# Patient Record
Sex: FEMALE | Race: WHITE | NOT HISPANIC OR LATINO | Employment: FULL TIME | URBAN - METROPOLITAN AREA
[De-identification: names, ages, dates, MRNs, and addresses within clinical notes are randomized per-mention and may not be internally consistent; named-entity substitution may affect disease eponyms.]

---

## 2023-04-05 ENCOUNTER — TELEPHONE (OUTPATIENT)
Dept: PRIMARY CARE | Facility: CLINIC | Age: 23
End: 2023-04-05
Payer: COMMERCIAL

## 2023-04-05 NOTE — TELEPHONE ENCOUNTER
PATIENT CALLED AND SAID SHE TESTED POSITIVE FOR COVID AND WANTED TO KNOW IF SHE COULD BE PRESCRIBE THE MEDICATION FOR IT.SHE WASN'T SURE IF SHE NEEDED AN APPOINTMENT OR NOT

## 2023-05-31 ENCOUNTER — OFFICE VISIT (OUTPATIENT)
Dept: PRIMARY CARE | Facility: CLINIC | Age: 23
End: 2023-05-31
Payer: COMMERCIAL

## 2023-05-31 VITALS
BODY MASS INDEX: 34.11 KG/M2 | OXYGEN SATURATION: 100 % | WEIGHT: 186.5 LBS | RESPIRATION RATE: 18 BRPM | HEART RATE: 67 BPM | DIASTOLIC BLOOD PRESSURE: 78 MMHG | TEMPERATURE: 97.5 F | SYSTOLIC BLOOD PRESSURE: 122 MMHG

## 2023-05-31 DIAGNOSIS — M54.2 NECK PAIN: Primary | ICD-10-CM

## 2023-05-31 DIAGNOSIS — Z00.00 ANNUAL PHYSICAL EXAM: ICD-10-CM

## 2023-05-31 PROBLEM — N92.0 MENSTRUAL SPOTTING: Status: ACTIVE | Noted: 2023-05-31

## 2023-05-31 PROBLEM — G43.909 MIGRAINE: Status: ACTIVE | Noted: 2023-05-31

## 2023-05-31 PROBLEM — R30.0 DYSURIA: Status: ACTIVE | Noted: 2023-05-31

## 2023-05-31 PROBLEM — R11.0 NAUSEA IN ADULT PATIENT: Status: ACTIVE | Noted: 2023-05-31

## 2023-05-31 PROBLEM — R10.2 PELVIC PAIN IN FEMALE: Status: ACTIVE | Noted: 2023-05-31

## 2023-05-31 PROBLEM — F41.9 ANXIETY: Status: ACTIVE | Noted: 2023-05-31

## 2023-05-31 PROCEDURE — 1036F TOBACCO NON-USER: CPT | Performed by: FAMILY MEDICINE

## 2023-05-31 PROCEDURE — 99214 OFFICE O/P EST MOD 30 MIN: CPT | Performed by: FAMILY MEDICINE

## 2023-05-31 RX ORDER — NADOLOL 40 MG/1
2 TABLET ORAL DAILY
COMMUNITY
Start: 2021-07-15 | End: 2024-05-30 | Stop reason: ALTCHOICE

## 2023-05-31 RX ORDER — METHYLPHENIDATE HYDROCHLORIDE 36 MG/1
36 TABLET ORAL
COMMUNITY
Start: 2023-05-16

## 2023-05-31 RX ORDER — LEVONORGESTREL 19.5 MG/1
INTRAUTERINE DEVICE INTRAUTERINE ONCE
COMMUNITY
Start: 2022-11-18

## 2023-05-31 RX ORDER — ONDANSETRON 4 MG/1
8 TABLET, FILM COATED ORAL EVERY 8 HOURS PRN
COMMUNITY
Start: 2022-10-26 | End: 2023-05-31 | Stop reason: ALTCHOICE

## 2023-05-31 ASSESSMENT — PAIN SCALES - GENERAL: PAINLEVEL: 6

## 2023-05-31 NOTE — PROGRESS NOTES
Subjective   Patient ID: Nathalia Monzon is a 23 y.o. female who presents for Neck/shoulder pain (L side. Has been present for years but within the past week more persistent. Describes feeling hot, burning, pins/needles. Been dealing with since high school. Not using pain medication. ROM normal, but hurts with movement. ).    HPI : neck and shoulder pain   NKI  No previous work up  Denies weakness/constitutional symptoms    Review of Systems   All other systems reviewed and are negative.      Objective   /78 (BP Location: Left arm, Patient Position: Sitting, BP Cuff Size: Adult)   Pulse 67   Temp 36.4 °C (97.5 °F) (Temporal)   Resp 18   Wt 84.6 kg (186 lb 8 oz)   SpO2 100%   BMI 34.11 kg/m²     Physical Exam  Vitals and nursing note reviewed.   Cardiovascular:      Rate and Rhythm: Normal rate and regular rhythm.   Pulmonary:      Effort: Pulmonary effort is normal.      Breath sounds: Normal breath sounds.   Musculoskeletal:      Cervical back: Neck supple. No tenderness. Spinous process tenderness and muscular tenderness present.   Lymphadenopathy:      Cervical: No cervical adenopathy.   Neurological:      Mental Status: She is alert.      Sensory: Sensation is intact.      Motor: Motor function is intact.         Assessment/Plan   Problem List Items Addressed This Visit    None  Visit Diagnoses       Neck pain    -  Primary    Relevant Orders    XR cervical spine 2-3 views    CBC and Auto Differential    Comprehensive Metabolic Panel    TSH with reflex to Free T4 if abnormal    Lipid panel    High sensitivity CRP    Annual physical exam        Relevant Orders    HIV 1/2 antibodies, rapid    Hepatitis C antibody

## 2023-06-03 ENCOUNTER — LAB (OUTPATIENT)
Dept: LAB | Facility: LAB | Age: 23
End: 2023-06-03
Payer: COMMERCIAL

## 2023-06-03 DIAGNOSIS — R79.82 ELEVATED C-REACTIVE PROTEIN (CRP): Primary | ICD-10-CM

## 2023-06-03 DIAGNOSIS — Z00.00 ANNUAL PHYSICAL EXAM: ICD-10-CM

## 2023-06-03 DIAGNOSIS — M54.2 NECK PAIN: ICD-10-CM

## 2023-06-03 LAB
ALANINE AMINOTRANSFERASE (SGPT) (U/L) IN SER/PLAS: 15 U/L (ref 7–45)
ALBUMIN (G/DL) IN SER/PLAS: 4.3 G/DL (ref 3.4–5)
ALKALINE PHOSPHATASE (U/L) IN SER/PLAS: 93 U/L (ref 33–110)
ANION GAP IN SER/PLAS: 10 MMOL/L (ref 10–20)
ASPARTATE AMINOTRANSFERASE (SGOT) (U/L) IN SER/PLAS: 12 U/L (ref 9–39)
BASOPHILS (10*3/UL) IN BLOOD BY AUTOMATED COUNT: 0.04 X10E9/L (ref 0–0.1)
BASOPHILS/100 LEUKOCYTES IN BLOOD BY AUTOMATED COUNT: 0.5 % (ref 0–2)
BILIRUBIN TOTAL (MG/DL) IN SER/PLAS: 0.4 MG/DL (ref 0–1.2)
C REACTIVE PROTEIN (MG/L) IN SER/PLAS BY HIGH SENSIT: 19.1 MG/L
CALCIUM (MG/DL) IN SER/PLAS: 8.6 MG/DL (ref 8.6–10.3)
CARBON DIOXIDE, TOTAL (MMOL/L) IN SER/PLAS: 28 MMOL/L (ref 21–32)
CHLORIDE (MMOL/L) IN SER/PLAS: 107 MMOL/L (ref 98–107)
CHOLESTEROL (MG/DL) IN SER/PLAS: 166 MG/DL (ref 0–199)
CHOLESTEROL IN HDL (MG/DL) IN SER/PLAS: 34.3 MG/DL
CHOLESTEROL/HDL RATIO: 4.8
CREATININE (MG/DL) IN SER/PLAS: 0.73 MG/DL (ref 0.5–1.05)
EOSINOPHILS (10*3/UL) IN BLOOD BY AUTOMATED COUNT: 0.09 X10E9/L (ref 0–0.7)
EOSINOPHILS/100 LEUKOCYTES IN BLOOD BY AUTOMATED COUNT: 1.1 % (ref 0–6)
ERYTHROCYTE DISTRIBUTION WIDTH (RATIO) BY AUTOMATED COUNT: 13.4 % (ref 11.5–14.5)
ERYTHROCYTE MEAN CORPUSCULAR HEMOGLOBIN CONCENTRATION (G/DL) BY AUTOMATED: 32.4 G/DL (ref 32–36)
ERYTHROCYTE MEAN CORPUSCULAR VOLUME (FL) BY AUTOMATED COUNT: 90 FL (ref 80–100)
ERYTHROCYTES (10*6/UL) IN BLOOD BY AUTOMATED COUNT: 4.58 X10E12/L (ref 4–5.2)
GFR FEMALE: >90 ML/MIN/1.73M2
GLUCOSE (MG/DL) IN SER/PLAS: 94 MG/DL (ref 74–99)
HEMATOCRIT (%) IN BLOOD BY AUTOMATED COUNT: 41.1 % (ref 36–46)
HEMOGLOBIN (G/DL) IN BLOOD: 13.3 G/DL (ref 12–16)
HEPATITIS C VIRUS AB PRESENCE IN SERUM: NONREACTIVE
HIV 1/ 2 AG/AB SCREEN: NONREACTIVE
IMMATURE GRANULOCYTES/100 LEUKOCYTES IN BLOOD BY AUTOMATED COUNT: 0.3 % (ref 0–0.9)
LDL: 117 MG/DL (ref 0–119)
LEUKOCYTES (10*3/UL) IN BLOOD BY AUTOMATED COUNT: 8 X10E9/L (ref 4.4–11.3)
LYMPHOCYTES (10*3/UL) IN BLOOD BY AUTOMATED COUNT: 2.24 X10E9/L (ref 1.2–4.8)
LYMPHOCYTES/100 LEUKOCYTES IN BLOOD BY AUTOMATED COUNT: 28 % (ref 13–44)
MONOCYTES (10*3/UL) IN BLOOD BY AUTOMATED COUNT: 0.56 X10E9/L (ref 0.1–1)
MONOCYTES/100 LEUKOCYTES IN BLOOD BY AUTOMATED COUNT: 7 % (ref 2–10)
NEUTROPHILS (10*3/UL) IN BLOOD BY AUTOMATED COUNT: 5.04 X10E9/L (ref 1.2–7.7)
NEUTROPHILS/100 LEUKOCYTES IN BLOOD BY AUTOMATED COUNT: 63.1 % (ref 40–80)
PLATELETS (10*3/UL) IN BLOOD AUTOMATED COUNT: 251 X10E9/L (ref 150–450)
POTASSIUM (MMOL/L) IN SER/PLAS: 4.5 MMOL/L (ref 3.5–5.3)
PROTEIN TOTAL: 6.4 G/DL (ref 6.4–8.2)
SODIUM (MMOL/L) IN SER/PLAS: 140 MMOL/L (ref 136–145)
THYROTROPIN (MIU/L) IN SER/PLAS BY DETECTION LIMIT <= 0.05 MIU/L: 2.93 MIU/L (ref 0.44–3.98)
TRIGLYCERIDE (MG/DL) IN SER/PLAS: 76 MG/DL (ref 0–149)
UREA NITROGEN (MG/DL) IN SER/PLAS: 11 MG/DL (ref 6–23)
VLDL: 15 MG/DL (ref 0–40)

## 2023-06-03 PROCEDURE — 36415 COLL VENOUS BLD VENIPUNCTURE: CPT

## 2023-06-03 PROCEDURE — 86141 C-REACTIVE PROTEIN HS: CPT

## 2023-06-03 PROCEDURE — 87389 HIV-1 AG W/HIV-1&-2 AB AG IA: CPT

## 2023-06-03 PROCEDURE — 86803 HEPATITIS C AB TEST: CPT

## 2023-06-03 PROCEDURE — 80061 LIPID PANEL: CPT

## 2023-06-03 PROCEDURE — 84443 ASSAY THYROID STIM HORMONE: CPT

## 2023-06-03 PROCEDURE — 80053 COMPREHEN METABOLIC PANEL: CPT

## 2023-06-03 PROCEDURE — 85025 COMPLETE CBC W/AUTO DIFF WBC: CPT

## 2023-06-15 ENCOUNTER — APPOINTMENT (OUTPATIENT)
Dept: LAB | Facility: LAB | Age: 23
End: 2023-06-15
Payer: COMMERCIAL

## 2023-06-15 LAB
ANTI-DNA (DS): 1 IU/ML
BASOPHILS (10*3/UL) IN BLOOD BY AUTOMATED COUNT: 0.06 X10E9/L (ref 0–0.1)
BASOPHILS/100 LEUKOCYTES IN BLOOD BY AUTOMATED COUNT: 0.5 % (ref 0–2)
C REACTIVE PROTEIN (MG/L) IN SER/PLAS: 2.29 MG/DL
CITRULLINE ANTIBODY, IGG: <1 U/ML
COMPLEMENT C3 (MG/DL) IN SER/PLAS: 167 MG/DL (ref 87–200)
COMPLEMENT C4 (MG/DL) IN SER/PLAS: 33 MG/DL (ref 10–50)
EOSINOPHILS (10*3/UL) IN BLOOD BY AUTOMATED COUNT: 0.05 X10E9/L (ref 0–0.7)
EOSINOPHILS/100 LEUKOCYTES IN BLOOD BY AUTOMATED COUNT: 0.4 % (ref 0–6)
ERYTHROCYTE DISTRIBUTION WIDTH (RATIO) BY AUTOMATED COUNT: 13 % (ref 11.5–14.5)
ERYTHROCYTE MEAN CORPUSCULAR HEMOGLOBIN CONCENTRATION (G/DL) BY AUTOMATED: 32.7 G/DL (ref 32–36)
ERYTHROCYTE MEAN CORPUSCULAR VOLUME (FL) BY AUTOMATED COUNT: 88 FL (ref 80–100)
ERYTHROCYTES (10*6/UL) IN BLOOD BY AUTOMATED COUNT: 5.07 X10E12/L (ref 4–5.2)
HEMATOCRIT (%) IN BLOOD BY AUTOMATED COUNT: 44.7 % (ref 36–46)
HEMOGLOBIN (G/DL) IN BLOOD: 14.6 G/DL (ref 12–16)
IMMATURE GRANULOCYTES/100 LEUKOCYTES IN BLOOD BY AUTOMATED COUNT: 1.2 % (ref 0–0.9)
LEUKOCYTES (10*3/UL) IN BLOOD BY AUTOMATED COUNT: 12.1 X10E9/L (ref 4.4–11.3)
LYMPHOCYTES (10*3/UL) IN BLOOD BY AUTOMATED COUNT: 2.57 X10E9/L (ref 1.2–4.8)
LYMPHOCYTES/100 LEUKOCYTES IN BLOOD BY AUTOMATED COUNT: 21.3 % (ref 13–44)
MONOCYTES (10*3/UL) IN BLOOD BY AUTOMATED COUNT: 0.69 X10E9/L (ref 0.1–1)
MONOCYTES/100 LEUKOCYTES IN BLOOD BY AUTOMATED COUNT: 5.7 % (ref 2–10)
NEUTROPHILS (10*3/UL) IN BLOOD BY AUTOMATED COUNT: 8.55 X10E9/L (ref 1.2–7.7)
NEUTROPHILS/100 LEUKOCYTES IN BLOOD BY AUTOMATED COUNT: 70.9 % (ref 40–80)
NRBC (PER 100 WBCS) BY AUTOMATED COUNT: 0 /100 WBC (ref 0–0)
PLATELETS (10*3/UL) IN BLOOD AUTOMATED COUNT: 309 X10E9/L (ref 150–450)
RHEUMATOID FACTOR (IU/ML) IN SERUM OR PLASMA: <10 IU/ML (ref 0–15)
SEDIMENTATION RATE, ERYTHROCYTE: 18 MM/H (ref 0–20)

## 2023-06-16 LAB — ANTI-NUCLEAR ANTIBODY (ANA): NEGATIVE

## 2023-07-28 ENCOUNTER — TELEMEDICINE (OUTPATIENT)
Dept: PRIMARY CARE | Facility: CLINIC | Age: 23
End: 2023-07-28
Payer: COMMERCIAL

## 2023-07-28 DIAGNOSIS — R11.2 NAUSEA VOMITING AND DIARRHEA: Primary | ICD-10-CM

## 2023-07-28 DIAGNOSIS — R19.7 NAUSEA VOMITING AND DIARRHEA: Primary | ICD-10-CM

## 2023-07-28 PROCEDURE — 99213 OFFICE O/P EST LOW 20 MIN: CPT | Performed by: PHYSICIAN ASSISTANT

## 2023-07-28 RX ORDER — ONDANSETRON 4 MG/1
4 TABLET, ORALLY DISINTEGRATING ORAL EVERY 8 HOURS PRN
Qty: 20 TABLET | Refills: 0 | Status: SHIPPED | OUTPATIENT
Start: 2023-07-28 | End: 2023-08-04

## 2023-07-28 NOTE — PROGRESS NOTES
An interactive audio and video telecommunication system which permits real time communications between the patient (at the originating site) and provider (at the distant site) was utilized to provide this telehealth service.   Verbal consent was requested and obtained from Nathalia Monzon on this date, 07/28/23 for a telehealth visit.     Subjective    Nathalia Monzon is a 23 y.o. year old female patient presenting for virtual visit   Chief Complaint   Patient presents with    Diarrhea      Diarrhea 5x daily for 5 days  since just before she left from international trip to [place] and [place]. Some mucus has been noted but no blood. No fevers. She is hydrating well PO. NO cough. No known MERS contacts. She has soft stools poorly formed.   We discussed a more thorough eval at ER if not improving or if worsening.  Continue to hydrate, add electrolytes at this point. Recommend against antidiarrheals. If ongoing symptoms into next week, F/up PCP for Ova/parasites/Cx/toxins  Patient Active Problem List   Diagnosis    Anxiety    Dysuria    Menstrual spotting    Migraine    Nausea in adult patient    Pelvic pain in female       Past Medical History:   Diagnosis Date    Personal history of urinary (tract) infections 07/15/2021    History of urinary tract infection      Past Surgical History:   Procedure Laterality Date    OTHER SURGICAL HISTORY  08/19/2021    No history of surgery      No family history on file.   Social History     Tobacco Use    Smoking status: Never    Smokeless tobacco: Never   Substance Use Topics    Alcohol use: Not on file        Current Outpatient Medications:     levonorgestreL (Kyleena) 17.5 mcg/24 hrs (5 yrs) 19.5 mg intrauterine device, by intrauterine route 1 time., Disp: , Rfl:     methylphenidate (Concerta) 36 mg daily tablet, Take 1 tablet (36 mg) by mouth once daily in the morning. Take before meals., Disp: , Rfl:     nadolol (Corgard) 40 mg tablet, Take 2 tablets (80 mg) by mouth once daily.,  Disp: , Rfl:     ondansetron ODT (Zofran-ODT) 4 mg disintegrating tablet, Take 1 tablet (4 mg) by mouth every 8 hours if needed for nausea or vomiting for up to 7 days., Disp: 20 tablet, Rfl: 0    ubrogepant (Ubrelvy) 100 mg tablet tablet, Take 1 tablet (100 mg) by mouth if needed (Headaches)., Disp: , Rfl:      Review of Systems    Review of Systems:   Constitutional: Denies fever  HEENT: Denies ST, earache  CVS: Denies Chest pain  Pulmonary: Denies wheezing, SOB  GI: Denies N/V  : Denies dysuria  Musculoskeletal:  Denies myalgia  Neuro: Denies focal weakness or numbness.  Skin: Denies Rashes.  *Review of Systems is negative unless otherwise mentioned in HPI or ROS above.     Objective    VITALS  Pt does not have vitals available at time of visit.    Exam    Unable to perform physical exam in virtual platform    Assessment/Plan    Problem List Items Addressed This Visit    None  Visit Diagnoses       Nausea vomiting and diarrhea    -  Primary    Relevant Medications    ondansetron ODT (Zofran-ODT) 4 mg disintegrating tablet                 DISCHARGE    Please schedule a follow up visit     Any prescriptions were sent to the pharmacy, please make sure to pick them up and call if there are any issues or questions.     Thank you for trusting me to be a part of your healthcare.    Take care!     Seema Dozier PA-C  [facility]  [phone] [phone]     Please feel free to call the office, or return a message if you have any questions or concerns.

## 2023-10-26 ENCOUNTER — SPECIALTY PHARMACY (OUTPATIENT)
Dept: PHARMACY | Facility: CLINIC | Age: 23
End: 2023-10-26

## 2023-10-26 ENCOUNTER — PHARMACY VISIT (OUTPATIENT)
Dept: PHARMACY | Facility: CLINIC | Age: 23
End: 2023-10-26
Payer: COMMERCIAL

## 2023-10-26 PROCEDURE — RXMED WILLOW AMBULATORY MEDICATION CHARGE

## 2023-11-06 ENCOUNTER — SPECIALTY PHARMACY (OUTPATIENT)
Dept: PHARMACY | Facility: CLINIC | Age: 23
End: 2023-11-06

## 2023-11-06 NOTE — PROGRESS NOTES
[facility] Clinical Note    Nathalia Monzon is a 23 y.o. female, who is on the specialty pharmacy service for management of: Migraine Core with status of: (Enrolled)     Nathalia was contacted on 11/6/2023.    Refer to the encounter summary report for documentation details about patient counseling and education.      Medication Adherence  The importance of adherence was discussed with the patient and they were advised to take the medication as prescribed by their provider. Nathalia was encouraged to call her physician's office if they have a question regarding a missed dose.     Conclusion  Rate your quality of life on scale of 1-10: 8  Rate your satisfaction with  Specialty Pharmacy on scale of 1-10: 10 - Completely satisfied      Patient advised to contact the pharmacy if there are any changes to her medication list, including prescriptions, OTC medications, herbal products, or supplements. Patient was advised of [facility]’s dispensing process, refill timeline, contact information ([phone]), and patient management follow up. Patient confirmed understanding of education conducted during assessment. All patient questions and concerns were addressed to the best of my ability. Patient was encouraged to contact the specialty pharmacy with any questions or concerns.    Confirmed follow-up outreaches are properly scheduled. Reviewed goals of therapy in the program targets.    Trevor Oneal, EdisD

## 2023-11-22 ENCOUNTER — SPECIALTY PHARMACY (OUTPATIENT)
Dept: PHARMACY | Facility: CLINIC | Age: 23
End: 2023-11-22

## 2024-01-16 ENCOUNTER — SPECIALTY PHARMACY (OUTPATIENT)
Dept: PHARMACY | Facility: CLINIC | Age: 24
End: 2024-01-16

## 2024-01-16 PROCEDURE — RXMED WILLOW AMBULATORY MEDICATION CHARGE

## 2024-01-24 ENCOUNTER — PHARMACY VISIT (OUTPATIENT)
Dept: PHARMACY | Facility: CLINIC | Age: 24
End: 2024-01-24
Payer: COMMERCIAL

## 2024-01-31 ENCOUNTER — TELEPHONE (OUTPATIENT)
Dept: NEUROLOGY | Facility: CLINIC | Age: 24
End: 2024-01-31
Payer: COMMERCIAL

## 2024-01-31 DIAGNOSIS — G43.E11 INTRACTABLE CHRONIC MIGRAINE WITH AURA WITH STATUS MIGRAINOSUS: Primary | ICD-10-CM

## 2024-01-31 RX ORDER — METHYLPREDNISOLONE 4 MG/1
TABLET ORAL
Qty: 21 TABLET | Refills: 0 | Status: SHIPPED | OUTPATIENT
Start: 2024-01-31 | End: 2024-02-07

## 2024-01-31 NOTE — TELEPHONE ENCOUNTER
Nathalia was calling about her appointment needing rescheduled in May. It was already rescheduled once but I did have to move her until June. She has been having migraines the past two weeks that her ajovy is not helping with. She states that until then it has worked fine but is looking for other recommendations.      Please advise,  Thanks!

## 2024-01-31 NOTE — TELEPHONE ENCOUNTER
2 week headache at the start of January. Headaches have been more intense this month   Last Ajovy injection was 1/5/24. Was initially very helpful, she would have 1-2 migraines per moth but continues to have her daily low grade headaches. Has more towards the end of the month   Takes tylenol and excedrin 2-3 times per month   Discussed options, will try medrol dose pack to see if this can help break the migraine cycle   Discussed other preventative therapy options such as venlafaxine vs botox  and will pursue botox

## 2024-02-05 ENCOUNTER — SPECIALTY PHARMACY (OUTPATIENT)
Dept: PHARMACY | Facility: CLINIC | Age: 24
End: 2024-02-05

## 2024-02-05 NOTE — PROGRESS NOTES
[facility] Clinical Note    Nathalia Monzon is a 23 y.o. female, who is on the specialty pharmacy service for management of: Migraine Core with status of: (Enrolled)     Nathalia was contacted on 2/5/2024.    Refer to the encounter summary report for documentation details about patient counseling and education.      Medication Adherence  The importance of adherence was discussed with the patient and they were advised to take the medication as prescribed by their provider. Nathalia was encouraged to call her physician's office if they have a question regarding a missed dose.     Conclusion  Rate your quality of life on scale of 1-10: -- (declined.)  Rate your satisfaction with  Specialty Pharmacy on scale of 1-10: -- (declined)      Patient advised to contact the pharmacy if there are any changes to her medication list, including prescriptions, OTC medications, herbal products, or supplements. Patient was advised of [facility]’s dispensing process, refill timeline, contact information ([phone]), and patient management follow up. Patient confirmed understanding of education conducted during assessment. All patient questions and concerns were addressed to the best of my ability. Patient was encouraged to contact the specialty pharmacy with any questions or concerns.    Confirmed follow-up outreaches are properly scheduled. Reviewed goals of therapy in the program targets.    Taz Rojo, EdisD

## 2024-02-10 DIAGNOSIS — G43.E11 INTRACTABLE CHRONIC MIGRAINE WITH AURA WITH STATUS MIGRAINOSUS: ICD-10-CM

## 2024-02-14 ENCOUNTER — SPECIALTY PHARMACY (OUTPATIENT)
Dept: PHARMACY | Facility: CLINIC | Age: 24
End: 2024-02-14

## 2024-02-14 PROCEDURE — RXMED WILLOW AMBULATORY MEDICATION CHARGE

## 2024-02-15 ENCOUNTER — TELEPHONE (OUTPATIENT)
Dept: NEUROLOGY | Facility: CLINIC | Age: 24
End: 2024-02-15
Payer: COMMERCIAL

## 2024-02-15 NOTE — TELEPHONE ENCOUNTER
Email from  Specialty Pharmacy:    The insurance plan has denied coverage for this patient's medication (Ajovy). Your office has the option to try an alternative agent or appeal the coverage denial.     Her denial letter was scanned/tasked to you by Jaqueline. Would you like to appeal this decision? Her botox was approved and will be delivered next week. We will add her to your schedule on a day that works best for you once you're back from vacation. Thanks

## 2024-02-16 ENCOUNTER — PHARMACY VISIT (OUTPATIENT)
Dept: PHARMACY | Facility: CLINIC | Age: 24
End: 2024-02-16
Payer: COMMERCIAL

## 2024-03-04 ENCOUNTER — PROCEDURE VISIT (OUTPATIENT)
Dept: NEUROLOGY | Facility: CLINIC | Age: 24
End: 2024-03-04
Payer: COMMERCIAL

## 2024-03-04 DIAGNOSIS — G43.E11 INTRACTABLE CHRONIC MIGRAINE WITH AURA WITH STATUS MIGRAINOSUS: Primary | ICD-10-CM

## 2024-03-04 PROCEDURE — 64615 CHEMODENERV MUSC MIGRAINE: CPT | Performed by: STUDENT IN AN ORGANIZED HEALTH CARE EDUCATION/TRAINING PROGRAM

## 2024-03-04 NOTE — PROGRESS NOTES
Neurology Botulinum Injection    Subjective   Nathalia Monzon is an 23 y.o. female here for medical botulinum injection for Intractable chronic migraine with aura with status migrainosus [G43.E11].     Objective   Neurological Exam  Mental Status  Awake, alert and oriented to person, place and time. Speech is normal.    Cranial Nerves  CN III, IV, VI: Extraocular movements intact bilaterally.  CN VII: Full and symmetric facial movement.  CN VIII: Hearing is normal.    Motor   Strength is 5/5 throughout all four extremities.    Gait  Casual gait is normal including stance, stride, and arm swing.      Head/Face/Jaw Botulinum Injection    Date/Time: 3/4/2024 11:57 AM    Performed by: Jacinda Chandler MD  Authorized by: Jacinda Chandler MD      Consent:      Consent obtained:  Written     Consent given by:  Patient     Risks discussed:  Dysphagia, weakness, poor cosmetic result, infection and bleeding     Alternatives discussed:  No treatment    Universal protocol:      Relevant documents present and verified:  Yes       Site/side verified:  Yes       Immediately prior to procedure a time out was called:  Yes       Patient identity confirmed:  Verbally with patient    Procedure details:      EMG used?  No     Electrical stimulation used?  No     Diluted by:  Preservative free saline     Toxin (Brand):  OnaBoNT-A (Botox)     Total units available:  200       Right frontalis:  10 units divided amongst site(s)     Left frontalis:  10 units divided amongst site(s)     Right :  5 units divided amongst site(s)     Left :  5 units divided amongst site(s)     Procerus (midline):  5 units divided amongst site(s)     Right occipitalis:  15 units divided amongst site(s)     Left occipitalis:  15 units divided amongst site(s)     Right cervical paraspinal:  10 units divided amongst site(s)     Left cervical paraspinal:  10 units divided amongst site(s)     Right trapezius:  15 units divided amongst site(s)      Left trapezius:  15 units divided amongst site(s)     Right temporalis:  20 units divided amongst site(s)     Left temporalis:  20 units divided amongst site(s)         Total units injected:  155     Total units wasted:  45    Post-procedure details:      Patient tolerance of procedure:  Tolerated well, no immediate complications    Assessment/Plan     Diagnoses and all orders for this visit:  Intractable chronic migraine with aura with status migrainosus  -     onabotulinumtoxinA (Botox) injection 155 Units    F/up botox in 90 days

## 2024-03-06 ENCOUNTER — OFFICE VISIT (OUTPATIENT)
Dept: DERMATOLOGY | Facility: CLINIC | Age: 24
End: 2024-03-06
Payer: COMMERCIAL

## 2024-03-06 DIAGNOSIS — D22.9 MULTIPLE BENIGN NEVI: ICD-10-CM

## 2024-03-06 DIAGNOSIS — L57.8 DIFFUSE PHOTODAMAGE OF SKIN: ICD-10-CM

## 2024-03-06 DIAGNOSIS — D48.5 NEOPLASM OF UNCERTAIN BEHAVIOR OF SKIN: Primary | ICD-10-CM

## 2024-03-06 PROCEDURE — 1036F TOBACCO NON-USER: CPT | Performed by: STUDENT IN AN ORGANIZED HEALTH CARE EDUCATION/TRAINING PROGRAM

## 2024-03-06 PROCEDURE — 11102 TANGNTL BX SKIN SINGLE LES: CPT | Performed by: STUDENT IN AN ORGANIZED HEALTH CARE EDUCATION/TRAINING PROGRAM

## 2024-03-06 PROCEDURE — 99202 OFFICE O/P NEW SF 15 MIN: CPT | Performed by: STUDENT IN AN ORGANIZED HEALTH CARE EDUCATION/TRAINING PROGRAM

## 2024-03-06 PROCEDURE — 88305 TISSUE EXAM BY PATHOLOGIST: CPT | Performed by: DERMATOLOGY

## 2024-03-06 NOTE — PATIENT INSTRUCTIONS

## 2024-03-06 NOTE — PROGRESS NOTES
Subjective   Nathalia Monzon is a 23 y.o. female who presents for the following: Skin Check (Last FBSE 11/17/2020. Lesion on scalp that she has noticed is very slightly more raised, no pain, present for many years.)    Skin Cancer History  None    Family History of Skin Cancer  None     Objective   Well appearing patient in no apparent distress; mood and affect are within normal limits.    A full examination was performed including scalp, head, eyes, ears, nose, lips, neck, chest, axillae, abdomen, back, buttocks, bilateral upper extremities, bilateral lower extremities, hands, feet, fingers, toes, fingernails, and toenails. All findings within normal limits unless otherwise noted below.    Scattered, uniform and benign-appearing, regular brown melanocytic papules and macules.    Right Inguinal Area  Irregular pink-black papule                Assessment/Plan   Neoplasm of uncertain behavior of skin  Right Inguinal Area    Lesion biopsy  Type of biopsy: tangential    Informed consent: discussed and consent obtained    Timeout: patient name, date of birth, surgical site, and procedure verified    Procedure prep:  Patient was prepped and draped  Anesthesia: the lesion was anesthetized in a standard fashion    Anesthetic:  1% lidocaine w/ epinephrine 1-100,000 local infiltration  Instrument used: DermaBlade    Hemostasis achieved with: aluminum chloride    Outcome: patient tolerated procedure well    Post-procedure details: sterile dressing applied and wound care instructions given    Dressing type: petrolatum and bandage      Staff Communication: Dermatology Local Anesthesia: 1 % Lidocaine / Epinephrine - Amount: 0.2cc    Specimen 1 - Dermatopathology- DERM LAB  Differential Diagnosis: r/o dysplastic nevus vs melanoma  Check Margins Yes/No?:    Comments:    Dermpath Lab: Routine Histopathology (formalin-fixed tissue)    -Discussed uncertain diagnosis of lesion and recommended biopsy to help clarify diagnosis.    -Discussed R/B of procedure including risk of scar. Patient verbalized understanding and agreement with plan for biopsy today.      Diffuse photodamage of skin    Related Procedures  Follow Up In Dermatology - Established Patient    Multiple benign nevi    Reassured of benign nature of lesions        Scribe Attestation  By signing my name below, I, Luna Jolley LPN , Scribe   attest that this documentation has been prepared under the direction and in the presence of Contreras Hogan MD.

## 2024-03-08 LAB
LABORATORY COMMENT REPORT: NORMAL
PATH REPORT.FINAL DX SPEC: NORMAL
PATH REPORT.GROSS SPEC: NORMAL
PATH REPORT.MICROSCOPIC SPEC OTHER STN: NORMAL
PATH REPORT.RELEVANT HX SPEC: NORMAL
PATH REPORT.TOTAL CANCER: NORMAL

## 2024-04-18 ENCOUNTER — SPECIALTY PHARMACY (OUTPATIENT)
Dept: PHARMACY | Facility: CLINIC | Age: 24
End: 2024-04-18

## 2024-05-09 ENCOUNTER — SPECIALTY PHARMACY (OUTPATIENT)
Dept: PHARMACY | Facility: CLINIC | Age: 24
End: 2024-05-09

## 2024-05-09 DIAGNOSIS — G43.E11 INTRACTABLE CHRONIC MIGRAINE WITH AURA WITH STATUS MIGRAINOSUS: ICD-10-CM

## 2024-05-09 PROCEDURE — RXMED WILLOW AMBULATORY MEDICATION CHARGE

## 2024-05-17 ENCOUNTER — SPECIALTY PHARMACY (OUTPATIENT)
Dept: PHARMACY | Facility: CLINIC | Age: 24
End: 2024-05-17

## 2024-05-20 ENCOUNTER — SPECIALTY PHARMACY (OUTPATIENT)
Dept: PHARMACY | Facility: CLINIC | Age: 24
End: 2024-05-20

## 2024-05-22 ENCOUNTER — PHARMACY VISIT (OUTPATIENT)
Dept: PHARMACY | Facility: CLINIC | Age: 24
End: 2024-05-22
Payer: COMMERCIAL

## 2024-05-30 ENCOUNTER — OFFICE VISIT (OUTPATIENT)
Dept: PRIMARY CARE | Facility: CLINIC | Age: 24
End: 2024-05-30
Payer: COMMERCIAL

## 2024-05-30 VITALS
DIASTOLIC BLOOD PRESSURE: 83 MMHG | OXYGEN SATURATION: 98 % | BODY MASS INDEX: 32.41 KG/M2 | WEIGHT: 177.2 LBS | RESPIRATION RATE: 17 BRPM | SYSTOLIC BLOOD PRESSURE: 128 MMHG | HEART RATE: 94 BPM | TEMPERATURE: 97.7 F

## 2024-05-30 DIAGNOSIS — Z00.00 ANNUAL PHYSICAL EXAM: Primary | ICD-10-CM

## 2024-05-30 DIAGNOSIS — S16.1XXA STRAIN OF CERVICAL PORTION OF LEFT TRAPEZIUS MUSCLE: ICD-10-CM

## 2024-05-30 PROCEDURE — 99214 OFFICE O/P EST MOD 30 MIN: CPT | Performed by: FAMILY MEDICINE

## 2024-05-30 PROCEDURE — 1036F TOBACCO NON-USER: CPT | Performed by: FAMILY MEDICINE

## 2024-05-30 RX ORDER — CYCLOBENZAPRINE HCL 10 MG
10 TABLET ORAL NIGHTLY PRN
Qty: 30 TABLET | Refills: 0 | Status: SHIPPED | OUTPATIENT
Start: 2024-05-30 | End: 2025-01-25

## 2024-05-30 ASSESSMENT — PAIN SCALES - GENERAL: PAINLEVEL: 6

## 2024-05-30 ASSESSMENT — PATIENT HEALTH QUESTIONNAIRE - PHQ9
2. FEELING DOWN, DEPRESSED OR HOPELESS: NOT AT ALL
SUM OF ALL RESPONSES TO PHQ9 QUESTIONS 1 AND 2: 0
1. LITTLE INTEREST OR PLEASURE IN DOING THINGS: NOT AT ALL

## 2024-05-30 NOTE — PROGRESS NOTES
Subjective   Patient ID: Nathalia Monzon is a 24 y.o. female who presents for Shoulder Pain (X 1 year progressively worse).    HPI : left sided neck/shoulder pain  Sharp.  NKI  Some times debilitating  Denies weakness/numbness.  Treatment : tylenol with no relief.    Migraine HA: precipitated by exertion: she can not exercise.  Not effective response from various medications: started on Botox.    Review of Systems   All other systems reviewed and are negative.      Objective   /83 (BP Location: Left arm, Patient Position: Sitting, BP Cuff Size: Adult)   Pulse 94   Temp 36.5 °C (97.7 °F) (Temporal)   Resp 17   Wt 80.4 kg (177 lb 3.2 oz)   LMP 05/15/2024 (Approximate)   SpO2 98%   BMI 32.41 kg/m²     Physical Exam  Vitals and nursing note reviewed.   Cardiovascular:      Rate and Rhythm: Normal rate and regular rhythm.   Pulmonary:      Effort: Pulmonary effort is normal.      Breath sounds: Normal breath sounds.   Musculoskeletal:      Cervical back: Neck supple. Muscular tenderness (left trapezius) present. No spinous process tenderness.   Lymphadenopathy:      Cervical: No cervical adenopathy.   Neurological:      Mental Status: She is alert.      Sensory: Sensation is intact.      Motor: Motor function is intact.   Psychiatric:         Mood and Affect: Mood normal.         Behavior: Behavior normal.         Thought Content: Thought content normal.         Judgment: Judgment normal.         Assessment/Plan   Diagnoses and all orders for this visit:  Annual physical exam  -     High sensitivity CRP; Future  -     Lipid panel; Future  -     Comprehensive Metabolic Panel; Future  -     TSH; Future  -     CBC and Auto Differential; Future  -     cyclobenzaprine (Flexeril) 10 mg tablet; Take 1 tablet (10 mg) by mouth as needed at bedtime for muscle spasms.  Strain of cervical portion of left trapezius muscle

## 2024-05-30 NOTE — Clinical Note
Patient is dissatisfied with visit. She kept repeating that I am invalidating her concerns.Please schedule follow ups with Cyndi card CNP at [place],

## 2024-06-04 ENCOUNTER — PROCEDURE VISIT (OUTPATIENT)
Dept: NEUROLOGY | Facility: CLINIC | Age: 24
End: 2024-06-04
Payer: COMMERCIAL

## 2024-06-04 ENCOUNTER — LAB (OUTPATIENT)
Dept: LAB | Facility: LAB | Age: 24
End: 2024-06-04
Payer: COMMERCIAL

## 2024-06-04 VITALS
TEMPERATURE: 97.7 F | DIASTOLIC BLOOD PRESSURE: 68 MMHG | BODY MASS INDEX: 32.57 KG/M2 | WEIGHT: 177 LBS | HEIGHT: 62 IN | SYSTOLIC BLOOD PRESSURE: 112 MMHG | HEART RATE: 82 BPM

## 2024-06-04 DIAGNOSIS — Z00.00 ANNUAL PHYSICAL EXAM: ICD-10-CM

## 2024-06-04 DIAGNOSIS — G43.E11 INTRACTABLE CHRONIC MIGRAINE WITH AURA WITH STATUS MIGRAINOSUS: Primary | ICD-10-CM

## 2024-06-04 LAB
ALBUMIN SERPL BCP-MCNC: 4.6 G/DL (ref 3.4–5)
ALP SERPL-CCNC: 90 U/L (ref 33–110)
ALT SERPL W P-5'-P-CCNC: 21 U/L (ref 7–45)
ANION GAP SERPL CALC-SCNC: 13 MMOL/L (ref 10–20)
AST SERPL W P-5'-P-CCNC: 14 U/L (ref 9–39)
BASOPHILS # BLD AUTO: 0.03 X10*3/UL (ref 0–0.1)
BASOPHILS NFR BLD AUTO: 0.3 %
BILIRUB SERPL-MCNC: 0.7 MG/DL (ref 0–1.2)
BUN SERPL-MCNC: 8 MG/DL (ref 6–23)
CALCIUM SERPL-MCNC: 9.1 MG/DL (ref 8.6–10.3)
CHLORIDE SERPL-SCNC: 104 MMOL/L (ref 98–107)
CHOLEST SERPL-MCNC: 160 MG/DL (ref 0–199)
CHOLESTEROL/HDL RATIO: 4.8
CO2 SERPL-SCNC: 26 MMOL/L (ref 21–32)
CREAT SERPL-MCNC: 0.77 MG/DL (ref 0.5–1.05)
CRP SERPL HS-MCNC: 17.7 MG/L
EGFRCR SERPLBLD CKD-EPI 2021: >90 ML/MIN/1.73M*2
EOSINOPHIL # BLD AUTO: 0.03 X10*3/UL (ref 0–0.7)
EOSINOPHIL NFR BLD AUTO: 0.3 %
ERYTHROCYTE [DISTWIDTH] IN BLOOD BY AUTOMATED COUNT: 13.1 % (ref 11.5–14.5)
GLUCOSE SERPL-MCNC: 98 MG/DL (ref 74–99)
HCT VFR BLD AUTO: 43.5 % (ref 36–46)
HDLC SERPL-MCNC: 33.6 MG/DL
HGB BLD-MCNC: 14.1 G/DL (ref 12–16)
IMM GRANULOCYTES # BLD AUTO: 0.03 X10*3/UL (ref 0–0.7)
IMM GRANULOCYTES NFR BLD AUTO: 0.3 % (ref 0–0.9)
LDLC SERPL CALC-MCNC: 110 MG/DL
LYMPHOCYTES # BLD AUTO: 2.08 X10*3/UL (ref 1.2–4.8)
LYMPHOCYTES NFR BLD AUTO: 23.8 %
MCH RBC QN AUTO: 28.7 PG (ref 26–34)
MCHC RBC AUTO-ENTMCNC: 32.4 G/DL (ref 32–36)
MCV RBC AUTO: 88 FL (ref 80–100)
MONOCYTES # BLD AUTO: 0.43 X10*3/UL (ref 0.1–1)
MONOCYTES NFR BLD AUTO: 4.9 %
NEUTROPHILS # BLD AUTO: 6.15 X10*3/UL (ref 1.2–7.7)
NEUTROPHILS NFR BLD AUTO: 70.4 %
NON HDL CHOLESTEROL: 126 MG/DL (ref 0–149)
NRBC BLD-RTO: 0 /100 WBCS (ref 0–0)
PLATELET # BLD AUTO: 240 X10*3/UL (ref 150–450)
POTASSIUM SERPL-SCNC: 4.2 MMOL/L (ref 3.5–5.3)
PROT SERPL-MCNC: 6.9 G/DL (ref 6.4–8.2)
RBC # BLD AUTO: 4.92 X10*6/UL (ref 4–5.2)
SODIUM SERPL-SCNC: 139 MMOL/L (ref 136–145)
TRIGL SERPL-MCNC: 83 MG/DL (ref 0–149)
TSH SERPL-ACNC: 2.69 MIU/L (ref 0.44–3.98)
VLDL: 17 MG/DL (ref 0–40)
WBC # BLD AUTO: 8.8 X10*3/UL (ref 4.4–11.3)

## 2024-06-04 PROCEDURE — 86141 C-REACTIVE PROTEIN HS: CPT

## 2024-06-04 PROCEDURE — 80053 COMPREHEN METABOLIC PANEL: CPT

## 2024-06-04 PROCEDURE — 80061 LIPID PANEL: CPT

## 2024-06-04 PROCEDURE — 85025 COMPLETE CBC W/AUTO DIFF WBC: CPT

## 2024-06-04 PROCEDURE — 36415 COLL VENOUS BLD VENIPUNCTURE: CPT

## 2024-06-04 PROCEDURE — 64615 CHEMODENERV MUSC MIGRAINE: CPT | Performed by: STUDENT IN AN ORGANIZED HEALTH CARE EDUCATION/TRAINING PROGRAM

## 2024-06-04 PROCEDURE — 84443 ASSAY THYROID STIM HORMONE: CPT

## 2024-06-04 ASSESSMENT — PATIENT HEALTH QUESTIONNAIRE - PHQ9
SUM OF ALL RESPONSES TO PHQ9 QUESTIONS 1 & 2: 0
1. LITTLE INTEREST OR PLEASURE IN DOING THINGS: NOT AT ALL
2. FEELING DOWN, DEPRESSED OR HOPELESS: NOT AT ALL

## 2024-06-04 ASSESSMENT — LIFESTYLE VARIABLES
HOW OFTEN DO YOU HAVE SIX OR MORE DRINKS ON ONE OCCASION: LESS THAN MONTHLY
SKIP TO QUESTIONS 9-10: 0
HOW MANY STANDARD DRINKS CONTAINING ALCOHOL DO YOU HAVE ON A TYPICAL DAY: 1 OR 2
AUDIT-C TOTAL SCORE: 3
HOW OFTEN DO YOU HAVE A DRINK CONTAINING ALCOHOL: 2-4 TIMES A MONTH

## 2024-06-04 NOTE — PROGRESS NOTES
Neurology Botulinum Injection    Subjective   Nathalia Monzon is an 24 y.o. female here for medical botulinum injection for Intractable chronic migraine with aura with status migrainosus [G43.E11].     Objective   Neurological Exam  Mental Status  Awake, alert and oriented to person, place and time. Speech is normal.    Cranial Nerves  CN III, IV, VI: Extraocular movements intact bilaterally.  CN VII: Full and symmetric facial movement.  CN VIII: Hearing is normal.    Motor   Strength is 5/5 throughout all four extremities.    Gait  Casual gait is normal including stance, stride, and arm swing.    Physical Exam  Eyes:      Extraocular Movements: Extraocular movements intact.   Neurological:      Motor: Motor strength is normal.  Psychiatric:         Speech: Speech normal.       Head/Face/Jaw Botulinum Injection    Date/Time: 6/4/2024 10:55 AM    Performed by: Jacinda Chandler MD  Authorized by: Jacinda Chandler MD      Consent:      Consent obtained:  Written     Consent given by:  Patient     Risks discussed:  Dysphagia, weakness, poor cosmetic result, infection and bleeding     Alternatives discussed:  No treatment    Universal protocol:      Relevant documents present and verified:  Yes       Site/side verified:  Yes       Immediately prior to procedure a time out was called:  Yes       Patient identity confirmed:  Verbally with patient    Procedure details:      EMG used?  No     Electrical stimulation used?  No     Diluted by:  Preservative free saline     Toxin (Brand):  OnaBoNT-A (Botox)     Total units available:  200       Right frontalis:  10 units divided amongst site(s)     Left frontalis:  10 units divided amongst site(s)     Right :  5 units divided amongst site(s)     Left :  5 units divided amongst site(s)     Procerus (midline):  5 units divided amongst site(s)     Right occipitalis:  15 units divided amongst site(s)     Left occipitalis:  15 units divided amongst site(s)      Right cervical paraspinal:  10 units divided amongst site(s)     Left cervical paraspinal:  10 units divided amongst site(s)     Right trapezius:  15 units divided amongst site(s)     Left trapezius:  15 units divided amongst site(s)     Right temporalis:  20 units divided amongst site(s)     Left temporalis:  20 units divided amongst site(s)         Total units injected:  155     Total units wasted:  45    Post-procedure details:      Patient tolerance of procedure:  Tolerated well, no immediate complications    Assessment/Plan     Diagnoses and all orders for this visit:  Intractable chronic migraine with aura with status migrainosus  -     onabotulinumtoxinA (Botox) injection 155 Units  Other orders  -     Head/Face/Jaw Botulinum Injection    F/up Botox in 90 days, has clinic visit 6/13/24

## 2024-06-05 DIAGNOSIS — E66.09 CLASS 1 OBESITY DUE TO EXCESS CALORIES WITHOUT SERIOUS COMORBIDITY WITH BODY MASS INDEX (BMI) OF 32.0 TO 32.9 IN ADULT: Primary | ICD-10-CM

## 2024-06-13 ENCOUNTER — APPOINTMENT (OUTPATIENT)
Dept: NEUROLOGY | Facility: CLINIC | Age: 24
End: 2024-06-13
Payer: COMMERCIAL

## 2024-06-13 VITALS
BODY MASS INDEX: 32.39 KG/M2 | WEIGHT: 176 LBS | SYSTOLIC BLOOD PRESSURE: 110 MMHG | TEMPERATURE: 97.8 F | HEIGHT: 62 IN | HEART RATE: 113 BPM | DIASTOLIC BLOOD PRESSURE: 84 MMHG

## 2024-06-13 DIAGNOSIS — G43.011 INTRACTABLE MIGRAINE WITHOUT AURA AND WITH STATUS MIGRAINOSUS: Primary | ICD-10-CM

## 2024-06-13 DIAGNOSIS — M54.12 CERVICAL RADICULOPATHY: ICD-10-CM

## 2024-06-13 PROBLEM — G43.909 MIGRAINE: Status: RESOLVED | Noted: 2023-05-31 | Resolved: 2024-06-13

## 2024-06-13 PROCEDURE — 99214 OFFICE O/P EST MOD 30 MIN: CPT | Performed by: STUDENT IN AN ORGANIZED HEALTH CARE EDUCATION/TRAINING PROGRAM

## 2024-06-13 PROCEDURE — 3008F BODY MASS INDEX DOCD: CPT | Performed by: STUDENT IN AN ORGANIZED HEALTH CARE EDUCATION/TRAINING PROGRAM

## 2024-06-13 PROCEDURE — 1036F TOBACCO NON-USER: CPT | Performed by: STUDENT IN AN ORGANIZED HEALTH CARE EDUCATION/TRAINING PROGRAM

## 2024-06-13 RX ORDER — FREMANEZUMAB-VFRM 225 MG/1.5ML
225 INJECTION SUBCUTANEOUS
Qty: 4.5 ML | Refills: 3 | Status: SHIPPED | OUTPATIENT
Start: 2024-06-13

## 2024-06-13 RX ORDER — FREMANEZUMAB-VFRM 225 MG/1.5ML
225 INJECTION SUBCUTANEOUS
Qty: 3 EACH | Refills: 3 | Status: SHIPPED | OUTPATIENT
Start: 2024-06-13 | End: 2024-06-13 | Stop reason: SDUPTHER

## 2024-06-13 ASSESSMENT — LIFESTYLE VARIABLES
HOW OFTEN DO YOU HAVE SIX OR MORE DRINKS ON ONE OCCASION: MONTHLY
AUDIT-C TOTAL SCORE: 3
HOW OFTEN DO YOU HAVE A DRINK CONTAINING ALCOHOL: MONTHLY OR LESS
HOW MANY STANDARD DRINKS CONTAINING ALCOHOL DO YOU HAVE ON A TYPICAL DAY: 1 OR 2
SKIP TO QUESTIONS 9-10: 0

## 2024-06-13 ASSESSMENT — PATIENT HEALTH QUESTIONNAIRE - PHQ9
2. FEELING DOWN, DEPRESSED OR HOPELESS: NOT AT ALL
SUM OF ALL RESPONSES TO PHQ9 QUESTIONS 1 & 2: 0
1. LITTLE INTEREST OR PLEASURE IN DOING THINGS: NOT AT ALL

## 2024-06-13 NOTE — PROGRESS NOTES
"Subjective     Nathalia Monzon is a 24 y.o. year old female for follow-up of migraine headaches.    She was last seen 7/7/2023. Since then, she continues to have headaches 2-3 days per week but improved severity since starting the botox and now resolve faster rather than lasting days. Ibuprofen does not help but midol. The intensity does not last as long and resolves faster with the Midol.      Also, she has noticed that if she lies on her side for a long period of time this will trigger a headache but not if she lies on her back. She continues to have shoulder pain on the left side, feels like pins and needles. She did not do PT. The pain is provoked by lifting objects or a heavy tote bag on her left shoulder.     She is off of the nadolol.     Location: bilateral forehead R>L  Quality: pounding  Associated symptoms: occasional nausea; no photophobia/phonophobia   Frequency: daily but with severe pain 2-3 times per months   Duration: 1-1.5 day, but in March had 7 days     Preventive: topiramate (side effects of \"brain fog\"), nadolol 40mg BID, amitriptyline (does not recall side effects)  Abortive: naproxen, sumatriptan (SE - tingling, dizziness), rizatriptan (SE - tingling, dizziness). Ubrelvy (not effective)    Patient Active Problem List   Diagnosis    Anxiety    Dysuria    Menstrual spotting    Nausea in adult patient    Pelvic pain in female    Myopia    Intractable migraine without aura and with status migrainosus    Cervical radiculopathy     Objective   Neurological Exam  Mental Status  Awake, alert and oriented to person, place and time. Speech is normal.    Cranial Nerves  CN III, IV, VI: Extraocular movements intact bilaterally.  CN VII: Full and symmetric facial movement.  CN VIII: Hearing is normal.    Motor   Strength is 5/5 throughout all four extremities.    Gait  Casual gait is normal including stance, stride, and arm swing.    Physical Exam  Eyes:      Extraocular Movements: Extraocular movements " intact.   Neurological:      Motor: Motor strength is normal.  Psychiatric:         Speech: Speech normal.       Assessment/Plan   Diagnoses and all orders for this visit:  Intractable migraine without aura and with status migrainosus  Cervical radiculopathy    Intractable migraine without aura with status: will continue Botox for preventative therapy. CGRP was initially denied, wanted to give her enough time with botox. However, she is still having at least 3 days per week though are less severe. Will try adding Ajovy to the Botox therpay. Will continue with Midol for abortive treatment. If not benefit, discussed referral to clinical trial [id number]   Cervical radiculopathy, L: may be provoking headaches. Will check EMG-NC. Consider referral to PT     Follow-up in 6 months

## 2024-06-14 ENCOUNTER — SPECIALTY PHARMACY (OUTPATIENT)
Dept: PHARMACY | Facility: CLINIC | Age: 24
End: 2024-06-14

## 2024-06-17 ENCOUNTER — APPOINTMENT (OUTPATIENT)
Dept: PHARMACY | Facility: HOSPITAL | Age: 24
End: 2024-06-17
Payer: COMMERCIAL

## 2024-08-02 ENCOUNTER — SPECIALTY PHARMACY (OUTPATIENT)
Dept: PHARMACY | Facility: CLINIC | Age: 24
End: 2024-08-02

## 2024-08-02 DIAGNOSIS — G43.E11 INTRACTABLE CHRONIC MIGRAINE WITH AURA WITH STATUS MIGRAINOSUS: ICD-10-CM

## 2024-08-05 PROCEDURE — RXMED WILLOW AMBULATORY MEDICATION CHARGE

## 2024-08-08 ENCOUNTER — SPECIALTY PHARMACY (OUTPATIENT)
Dept: PHARMACY | Facility: CLINIC | Age: 24
End: 2024-08-08

## 2024-08-13 ENCOUNTER — PHARMACY VISIT (OUTPATIENT)
Dept: PHARMACY | Facility: CLINIC | Age: 24
End: 2024-08-13
Payer: COMMERCIAL

## 2024-09-03 ENCOUNTER — SPECIALTY PHARMACY (OUTPATIENT)
Dept: PHARMACY | Facility: CLINIC | Age: 24
End: 2024-09-03

## 2024-09-03 PROCEDURE — RXMED WILLOW AMBULATORY MEDICATION CHARGE

## 2024-09-16 ENCOUNTER — PHARMACY VISIT (OUTPATIENT)
Dept: PHARMACY | Facility: CLINIC | Age: 24
End: 2024-09-16
Payer: COMMERCIAL

## 2024-09-16 ENCOUNTER — APPOINTMENT (OUTPATIENT)
Dept: NEUROLOGY | Facility: HOSPITAL | Age: 24
End: 2024-09-16
Payer: COMMERCIAL

## 2024-09-17 ENCOUNTER — APPOINTMENT (OUTPATIENT)
Dept: NEUROLOGY | Facility: CLINIC | Age: 24
End: 2024-09-17
Payer: COMMERCIAL

## 2024-09-17 ENCOUNTER — SPECIALTY PHARMACY (OUTPATIENT)
Dept: PHARMACY | Facility: CLINIC | Age: 24
End: 2024-09-17

## 2024-09-17 VITALS — WEIGHT: 177 LBS | HEIGHT: 62 IN | BODY MASS INDEX: 32.57 KG/M2

## 2024-09-17 DIAGNOSIS — G43.011 INTRACTABLE MIGRAINE WITHOUT AURA AND WITH STATUS MIGRAINOSUS: Primary | ICD-10-CM

## 2024-09-17 DIAGNOSIS — G43.719 INTRACTABLE CHRONIC MIGRAINE WITHOUT AURA AND WITHOUT STATUS MIGRAINOSUS: ICD-10-CM

## 2024-09-17 PROCEDURE — 64615 CHEMODENERV MUSC MIGRAINE: CPT | Performed by: STUDENT IN AN ORGANIZED HEALTH CARE EDUCATION/TRAINING PROGRAM

## 2024-09-17 ASSESSMENT — LIFESTYLE VARIABLES
AUDIT-C TOTAL SCORE: 3
HOW OFTEN DO YOU HAVE SIX OR MORE DRINKS ON ONE OCCASION: NEVER
HOW OFTEN DO YOU HAVE A DRINK CONTAINING ALCOHOL: 2-4 TIMES A MONTH
SKIP TO QUESTIONS 9-10: 0
HOW MANY STANDARD DRINKS CONTAINING ALCOHOL DO YOU HAVE ON A TYPICAL DAY: 3 OR 4

## 2024-09-17 NOTE — PROGRESS NOTES
[facility] Clinical Note    Nathalia oMnzon is a 24 y.o. female, who is on the specialty pharmacy service for management of:  Migraine Core.    Nathalia Monzon is taking: Ajovy 225mg.    Medication Receipt Date: 09/17/2024  Medication Start Date (planned or actual): 09/17/2024    Nathalia was contacted on 9/17/2024 at 3:25 PM for a virtual pharmacy visit with encounter number [id number] from:   [facility]  [address] [place] [id number]  Dept: [phone]  Dept Fax: [phone]    Nathalia was offered a Telemedicine Video visit or Telephone visit.  Nathalia consented to a telephone visit, which was performed.    The most recent encounter visit with the referring prescriber Jacinda Chandler MD on 09/17/2024 was reviewed.  Pharmacy will continue to collaborate in the care of this patient with the referring prescriber Jacinda Chandler MD.    General Assessment      Impression/Plan  IMPRESSION/PLAN:  Is patient high risk (potential patients:  pregnancy, geriatric, pediatric)?  no  Is laboratory follow-up needed? no  Is a clinical intervention needed? no  Next reassessment date? 12/16/2024  Additional comments:     Refer to the encounter summary report for documentation details about patient counseling and education.      Medication Adherence    The importance of adherence was discussed with the patient and they were advised to take the medication as prescribed by their provider. Patient was encouraged to call their physician's office if they have a question regarding a missed dose.     QOL/Patient Satisfaction  Rate your quality of life on scale of 1-10: 9  Rate your satisfaction with  Specialty Pharmacy on scale of 1-10: 10 - Completely satisfied      Patient was advised to contact the pharmacy if there are any changes to their medication list, including prescriptions, OTC medications, herbal products, or supplements. Patient was advised of [facility]'s dispensing process, refill timeline, contact information ([phone]), and patient management follow up. Patient confirmed understanding of education conducted during assessment. All patient questions and concerns were addressed to the best of my ability. Patient was encouraged to contact the specialty pharmacy with any questions or concerns.    Confirmed follow-up outreaches are properly scheduled and reviewed goals of therapy with the patient.        Glo Schrader, PharmD

## 2024-09-17 NOTE — PROGRESS NOTES
Neurology Botulinum Injection    Subjective   Nathalia Monzon is an 24 y.o. female here for medical botulinum injection for Intractable migraine without aura and with status migrainosus [G43.011].     Went to [place], did really well. One migraine, a few mild headaches. Combination of Ajovy and Botox is working very well. She can now drink alcohol on occasion and tolerates it without getting a migraine.    Objective   Neurological Exam  Mental Status  Awake, alert and oriented to person, place and time. Speech is normal.    Cranial Nerves  CN III, IV, VI: Extraocular movements intact bilaterally.  CN VII: Full and symmetric facial movement.  CN VIII: Hearing is normal.    Motor   Strength is 5/5 throughout all four extremities.    Gait  Casual gait is normal including stance, stride, and arm swing.    Physical Exam  Eyes:      Extraocular Movements: Extraocular movements intact.   Neurological:      Motor: Motor strength is normal.  Psychiatric:         Speech: Speech normal.       Head/Face/Jaw Botulinum Injection    Date/Time: 9/17/2024 2:19 PM    Performed by: Jacinda Chandler MD  Authorized by: Jacinda Chandler MD      Consent:      Consent obtained:  Written     Consent given by:  Patient     Risks discussed:  Dysphagia, weakness, poor cosmetic result, infection and bleeding     Alternatives discussed:  No treatment    Universal protocol:      Relevant documents present and verified:  Yes       Site/side verified:  Yes       Immediately prior to procedure a time out was called:  Yes       Patient identity confirmed:  Verbally with patient    Procedure details:      EMG used?  No     Electrical stimulation used?  No     Diluted by:  Preservative free saline     Toxin (Brand):  OnaBoNT-A (Botox)     Total units available:  200       Right frontalis:  10 units divided amongst site(s)     Left frontalis:  10 units divided amongst site(s)     Right :  5 units divided amongst site(s)     Left :  5  units divided amongst site(s)     Procerus (midline):  5 units divided amongst site(s)     Right occipitalis:  15 units divided amongst site(s)     Left occipitalis:  15 units divided amongst site(s)     Right cervical paraspinal:  10 units divided amongst site(s)     Left cervical paraspinal:  10 units divided amongst site(s)     Right trapezius:  15 units divided amongst site(s)     Left trapezius:  15 units divided amongst site(s)     Right temporalis:  20 units divided amongst site(s)     Left temporalis:  20 units divided amongst site(s)         Total units injected:  155     Total units wasted:  45    Post-procedure details:      Patient tolerance of procedure:  Tolerated well, no immediate complications    Assessment/Plan   Diagnoses and all orders for this visit:  Intractable migraine without aura and with status migrainosus  -     onabotulinumtoxinA (Botox) injection 155 Units  Intractable chronic migraine without aura and without status migrainosus    F/up in 90 days

## 2024-10-28 DIAGNOSIS — G43.E11 INTRACTABLE CHRONIC MIGRAINE WITH AURA WITH STATUS MIGRAINOSUS: ICD-10-CM

## 2024-11-06 PROCEDURE — RXMED WILLOW AMBULATORY MEDICATION CHARGE

## 2024-11-12 ENCOUNTER — SPECIALTY PHARMACY (OUTPATIENT)
Dept: PHARMACY | Facility: CLINIC | Age: 24
End: 2024-11-12

## 2024-11-12 ENCOUNTER — PHARMACY VISIT (OUTPATIENT)
Dept: PHARMACY | Facility: CLINIC | Age: 24
End: 2024-11-12
Payer: COMMERCIAL

## 2024-11-12 PROCEDURE — RXMED WILLOW AMBULATORY MEDICATION CHARGE

## 2024-11-22 ENCOUNTER — PHARMACY VISIT (OUTPATIENT)
Dept: PHARMACY | Facility: CLINIC | Age: 24
End: 2024-11-22
Payer: COMMERCIAL

## 2024-12-17 ENCOUNTER — APPOINTMENT (OUTPATIENT)
Dept: NEUROLOGY | Facility: CLINIC | Age: 24
End: 2024-12-17
Payer: COMMERCIAL

## 2024-12-18 ENCOUNTER — APPOINTMENT (OUTPATIENT)
Dept: NEUROLOGY | Facility: CLINIC | Age: 24
End: 2024-12-18
Payer: COMMERCIAL

## 2024-12-18 DIAGNOSIS — G43.011 INTRACTABLE MIGRAINE WITHOUT AURA AND WITH STATUS MIGRAINOSUS: ICD-10-CM

## 2024-12-18 DIAGNOSIS — G43.719 INTRACTABLE CHRONIC MIGRAINE WITHOUT AURA AND WITHOUT STATUS MIGRAINOSUS: Primary | ICD-10-CM

## 2024-12-18 NOTE — PROGRESS NOTES
Neurology Botulinum Injection    Subjective   Nathalia Monzon is an 24 y.o. female here for medical botulinum injection for Intractable chronic migraine without aura and without status migrainosus [G43.719].     Combination of botox with CGRP continues to work exceptionally well  No longer has daily headaches  Only needs to take 1 abortive per week at max and even then the abortive treatment is more effective and resolves her headache within 30-60 min     Objective   Neurological Exam  Mental Status  Awake, alert and oriented to person, place and time. Speech is normal.    Cranial Nerves  CN III, IV, VI: Extraocular movements intact bilaterally.  CN VII: Full and symmetric facial movement.  CN VIII: Hearing is normal.    Motor   Strength is 5/5 throughout all four extremities.    Gait  Casual gait is normal including stance, stride, and arm swing.    Physical Exam  Eyes:      Extraocular Movements: Extraocular movements intact.   Neurological:      Motor: Motor strength is normal.  Psychiatric:         Speech: Speech normal.       Head/Face/Jaw Botulinum Injection    Date/Time: 12/18/2024 1:08 PM    Performed by: Jacinda Chandler MD  Authorized by: Jacinda Chandler MD      Consent:      Consent obtained:  Written     Consent given by:  Patient     Risks discussed:  Dysphagia, weakness, poor cosmetic result, infection and bleeding     Alternatives discussed:  No treatment    Universal protocol:      Relevant documents present and verified:  Yes       Site/side verified:  Yes       Immediately prior to procedure a time out was called:  Yes       Patient identity confirmed:  Verbally with patient    Procedure details:      EMG used?  No     Electrical stimulation used?  No     Diluted by:  Preservative free saline     Total units available:  200       Right frontalis:  10 units divided amongst site(s)     Left frontalis:  10 units divided amongst site(s)     Right :  5 units divided amongst site(s)     Left  :  5 units divided amongst site(s)     Procerus (midline):  5 units divided amongst site(s)     Right occipitalis:  15 units divided amongst site(s)     Left occipitalis:  15 units divided amongst site(s)     Right cervical paraspinal:  10 units divided amongst site(s)     Left cervical paraspinal:  10 units divided amongst site(s)     Right trapezius:  15 units divided amongst site(s)     Left trapezius:  15 units divided amongst site(s)     Right temporalis:  20 units divided amongst site(s)     Left temporalis:  20 units divided amongst site(s)         Total units injected:  155     Total units wasted:  45    Post-procedure details:      Patient tolerance of procedure:  Tolerated well, no immediate complications    Assessment/Plan     Diagnoses and all orders for this visit:  Intractable chronic migraine without aura and without status migrainosus  -     onabotulinumtoxinA (Botox) injection 155 Units    F/up 90 days

## 2024-12-23 ENCOUNTER — SPECIALTY PHARMACY (OUTPATIENT)
Dept: PHARMACY | Facility: CLINIC | Age: 24
End: 2024-12-23

## 2024-12-23 NOTE — PROGRESS NOTES
[facility] Clinical Note    Nathalia Monzon is a 24 y.o. female, who is on the specialty pharmacy service for management of:  Migraine Core.    Nathalia Monzon is taking: Ajovy.    Medication Receipt Date: 11/14/24  Medication Start Date (planned or actual): Established on therapy    Nathalia was contacted on 12/23/2024 at 11:28 AM for a virtual pharmacy visit with encounter number [id number] from:   [facility]  [address] [place] [id number]  Dept: [phone]  Dept Fax: [phone]    Nathalia was offered a Telemedicine Video visit or Telephone visit.  Nathalia consented to a telephone visit, which was performed.    The most recent encounter visit with the referring prescriber Dr. Jacinda Chandler on 12/18/24 was reviewed.  Pharmacy will continue to collaborate in the care of this patient with the referring prescriber Dr. Jacinda Chandler.    General Assessment      Impression/Plan  IMPRESSION/PLAN:  Is patient high risk (potential patients:  pregnancy, geriatric, pediatric)? No  Is laboratory follow-up needed? No  Is a clinical intervention needed? No  Next reassessment date? 6/23/25  Additional comments: N/A    Refer to the encounter summary report for documentation details about patient counseling and education.      Medication Adherence    The importance of adherence was discussed with the patient and they were advised to take the medication as prescribed by their provider. Patient was encouraged to call their physician's office if they have a question regarding a missed dose.     QOL/Patient Satisfaction  Rate your quality of life on scale of 1-10: 10 - Completely satisfied  Rate your satisfaction with  Specialty Pharmacy on scale of 1-10: 10 - Completely satisfied      Patient was advised to contact the pharmacy if there are any changes to their medication list, including prescriptions, OTC medications, herbal products, or supplements. Patient  was advised of [facility]'s dispensing process, refill timeline, contact information ([phone]), and patient management follow up. Patient confirmed understanding of education conducted during assessment. All patient questions and concerns were addressed to the best of my ability. Patient was encouraged to contact the specialty pharmacy with any questions or concerns.    Confirmed follow-up outreaches are properly scheduled and reviewed goals of therapy with the patient.        RUBENS MARS, PharmD

## 2025-01-22 ENCOUNTER — APPOINTMENT (OUTPATIENT)
Dept: NEUROLOGY | Facility: CLINIC | Age: 25
End: 2025-01-22
Payer: COMMERCIAL

## 2025-01-30 ENCOUNTER — APPOINTMENT (OUTPATIENT)
Dept: NEUROLOGY | Facility: CLINIC | Age: 25
End: 2025-01-30
Payer: COMMERCIAL

## 2025-01-30 VITALS
BODY MASS INDEX: 34.65 KG/M2 | HEIGHT: 62 IN | HEART RATE: 105 BPM | WEIGHT: 188.3 LBS | TEMPERATURE: 97.1 F | DIASTOLIC BLOOD PRESSURE: 80 MMHG | SYSTOLIC BLOOD PRESSURE: 120 MMHG

## 2025-01-30 DIAGNOSIS — G43.719 INTRACTABLE CHRONIC MIGRAINE WITHOUT AURA AND WITHOUT STATUS MIGRAINOSUS: Primary | ICD-10-CM

## 2025-01-30 DIAGNOSIS — M54.12 CERVICAL RADICULOPATHY: ICD-10-CM

## 2025-01-30 DIAGNOSIS — G43.011 INTRACTABLE MIGRAINE WITHOUT AURA AND WITH STATUS MIGRAINOSUS: ICD-10-CM

## 2025-01-30 PROCEDURE — 99214 OFFICE O/P EST MOD 30 MIN: CPT | Performed by: STUDENT IN AN ORGANIZED HEALTH CARE EDUCATION/TRAINING PROGRAM

## 2025-01-30 PROCEDURE — 3008F BODY MASS INDEX DOCD: CPT | Performed by: STUDENT IN AN ORGANIZED HEALTH CARE EDUCATION/TRAINING PROGRAM

## 2025-01-30 PROCEDURE — 1036F TOBACCO NON-USER: CPT | Performed by: STUDENT IN AN ORGANIZED HEALTH CARE EDUCATION/TRAINING PROGRAM

## 2025-01-30 RX ORDER — METHYLPREDNISOLONE 4 MG/1
TABLET ORAL
Qty: 21 TABLET | Refills: 0 | Status: SHIPPED | OUTPATIENT
Start: 2025-01-30

## 2025-01-30 RX ORDER — ZAVEGEPANT 10 MG/.1ML
10 SPRAY NASAL DAILY PRN
Qty: 6 EACH | Refills: 1 | Status: SHIPPED | OUTPATIENT
Start: 2025-01-30

## 2025-01-30 ASSESSMENT — LIFESTYLE VARIABLES
HOW OFTEN DO YOU HAVE A DRINK CONTAINING ALCOHOL: MONTHLY OR LESS
HOW OFTEN DO YOU HAVE SIX OR MORE DRINKS ON ONE OCCASION: LESS THAN MONTHLY
SKIP TO QUESTIONS 9-10: 0
HOW MANY STANDARD DRINKS CONTAINING ALCOHOL DO YOU HAVE ON A TYPICAL DAY: 1 OR 2
AUDIT-C TOTAL SCORE: 2
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
## 2025-02-10 ENCOUNTER — SPECIALTY PHARMACY (OUTPATIENT)
Dept: PHARMACY | Facility: CLINIC | Age: 25
End: 2025-02-10

## 2025-02-13 ENCOUNTER — APPOINTMENT (OUTPATIENT)
Dept: PRIMARY CARE | Facility: CLINIC | Age: 25
End: 2025-02-13
Payer: COMMERCIAL

## 2025-02-13 DIAGNOSIS — E66.09 CLASS 1 OBESITY DUE TO EXCESS CALORIES WITHOUT SERIOUS COMORBIDITY WITH BODY MASS INDEX (BMI) OF 32.0 TO 32.9 IN ADULT: Primary | ICD-10-CM

## 2025-02-13 DIAGNOSIS — E66.811 CLASS 1 OBESITY DUE TO EXCESS CALORIES WITHOUT SERIOUS COMORBIDITY WITH BODY MASS INDEX (BMI) OF 32.0 TO 32.9 IN ADULT: Primary | ICD-10-CM

## 2025-02-13 PROCEDURE — 97802 MEDICAL NUTRITION INDIV IN: CPT | Performed by: DIETITIAN, REGISTERED

## 2025-02-13 NOTE — PATIENT INSTRUCTIONS
Discussed timing of meals, talked about making sure to eat at regular intervals to help control appetite later in the day. She's done a great start by adding a smoothie or protein shake in the morning. Talked about trying a snack at the end of the workday or after arriving home from work to see how that impacts appetite and energy level later at dinner.     Discussed composition of meals:  Encouraged patient to use the Plate Method to plan meals. The plate should be divided into even thirds.   - In one third, include starches / grains such as pasta, rice, quinoa, bread, wraps, corn, cereal, oatmeal, [employer] toast, waffles, potatoes,   - In the next third, include protein foods such as meat (chicken, turkey, beef, fish) and plant based proteins such as peanut butter, other nut butters, tofu, beans  - In the last third, include fruits and vegetables. This can include fresh fruit, canned fruit, frozen fruit, fresh vegetables, frozen vegetables, canned vegetables, corn  - A dairy product can also be included with the meal, such as yogurt, milk or cheese. Dairy alternatives can also be used such as almond milk, soy milk, and oat milk. Dairy foods and dairy alternative foods are rich in calcium, and some of them are also rich in protein (cheese, milk, soy milk, cottage cheese, and yogurt). Others are not rich in protein (almond milk, oat milk).   - Fat should be included with the plate, it might be used when preparing the food or to flavor the food. Examples include oil, butter, cream cheese, salad dressing, mayonnaise, avocado, or nuts. Fat can help promote a sense of fullness with the meal so it shouldn't be cut out of the meal entirely.     3. Provided specific examples of snacks which include 2 food groups and are high in protein.     4. Talked about gradually making changes towards relationship with food - not considering foods good/bad.

## 2025-02-13 NOTE — PROGRESS NOTES
Nutrition Initial Assessment:     Patient Nathalia Monzon is a 24 y.o. female being seen at [facility] who was referred by Dr. Ruano on 6/5/2024 for   1. Class 1 obesity due to excess calories without serious comorbidity with body mass index (BMI) of 32.0 to 32.9 in adult  Referral to Nutrition Services        Nutrition Assessment    Patient Active Problem List   Diagnosis    Anxiety    Dysuria    Menstrual spotting    Nausea in adult patient    Pelvic pain in female    Myopia    Intractable migraine without aura and with status migrainosus    Cervical radiculopathy    Class 1 obesity due to excess calories without serious comorbidity with body mass index (BMI) of 32.0 to 32.9 in adult     Nutrition History:  Food & Nutrition History: Patient is referred for weight loss. She would like to lose weight, but also would like to focus on improving eating habits overall, she describes a cycle of restriction and binging, said at times she has difficulty with feeling full. She works at  in the [employer]. Exercise has been recommended in the past for weight management, but she has limitations on exercise ability because execise can trigger a migraine. She has ADHD and reports that methylphenidate can interrupt appetite signals earlier in the day but appetite is larger later in the day. She has a therapist, has learned about the message of not classifying foods as good/bad. Patient prefers not to focus on calorie counting.  Food Allergies:  (none);  Food Intolerances: lactose intolerance. coffee, alcohol and lemonade are triggers for migraines  Nutrition-Related Medication Use: Prescription Medication Use: noted methylphenidate    Diet Recall:  Meal 1: B: 8-8:30, Capptain Protein shake or smoothies (pack from [employer] with kale, strawberries, blueberries, banana, oatmilk, vanilla yogurt). Drinks shake on the way to work/finishes it at work, doesn't typically finish the smoothie which is approx 12-16 ounces per  "serving. She didn't eat breakfast regularly in the past, she describes the addition of the shake/smoothie as a positive addition to her daily routine - it enhances energy in the morning  Meal 2: L: 12:30-1, packs leftovers to take to work: half serving of chicken parm with noodles. Or vermicelli with summer sausage, bell pepper, mushroom and onions.  Snack 2: occaisonally a square of chocolate.  Meal 3: D: 7-9. She has been meal prepping meals, but also describes that some nights she experiences \"binges.\"  Fluid Intake: primarily water. No soda, no coffee. EtOH rarely - migraine trigger.  Energy Intake: Good > 75 %      Food Preparation:  Cooking: Patient (was prepping for 5 days, now 3 days at a time to simplify the process and also promote more variety. Her kitchen space is small and she doens't want to take over the space because it is her father's house. They don't cook/eat the same cuisine.)  Grocery Shopping: Patient    Physical Activity:   Exertion can promote a migraine. We didn't focus on exercise today.    Anthropometrics:  Height: 157.5 cm (5' 2\")   Weight: 84.1 kg (185 lb 6.4 oz)   BMI (Calculated): 33.9                 Weight History:   Daily Weight  02/14/25 : 84.1 kg (185 lb 6.4 oz)  01/30/25 : 85.4 kg (188 lb 4.8 oz)  09/17/24 : 80.3 kg (177 lb)  06/13/24 : 79.8 kg (176 lb)  06/04/24 : 80.3 kg (177 lb)  05/30/24 : 80.4 kg (177 lb 3.2 oz)  07/07/23 : 84.4 kg (186 lb 2 oz)  06/15/23 : 83.5 kg (184 lb)  05/31/23 : 84.6 kg (186 lb 8 oz)  12/09/22 : 82.1 kg (181 lb)    Weight Change %:  Weight History / % Weight Change: since 2022, has been 177-188#, was 188# 1/30/2025. Patient desires weight loss, prefers to not have weight above 190#. Weight had increased during college about 5 years ago, peak of 190#.    Nutrition Focused Physical Exam Findings:  Defer    Nutrition Significant Labs:  CBC Trend:   Recent Labs     06/04/24  1017 06/15/23  1251 06/03/23  1039   WBC 8.8 12.1* 8.0   NRBC 0.0 0.0  --    RBC " "4.92 5.07 4.58   HGB 14.1 14.6 13.3   HCT 43.5 44.7 41.1   MCV 88 88 90   MCH 28.7  --   --    MCHC 32.4 32.7 32.4   RDW 13.1 13.0 13.4    309 251   , RFP + Serum Mag Trend:   Recent Labs     06/04/24  1017 06/03/23  1039   GLUCOSE 98 94    140   K 4.2 4.5    107   CO2 26 28   ANIONGAP 13 10   BUN 8 11   CREATININE 0.77 0.73   EGFR >90  --    CALCIUM 9.1 8.6   ALBUMIN 4.6 4.3   , LFT Trend:   Recent Labs     06/04/24  1017 06/03/23  1039   ALBUMIN 4.6 4.3   BILITOT 0.7 0.4   ALKPHOS 90 93   ALT 21 15   AST 14 12   PROT 6.9 6.4   , DM Specific Labs Trend (includes HgbA1C): No results for input(s): \"HGBA1C\", \"CPEPTIDE\" in the last 38804 hours.    No lab exists for component: \"BHDRXBUT\", \"EBI3JQH\", Lipid Panel Trend:    Recent Labs     06/04/24  1017 06/03/23  1039   CHOL 160 166   HDL 33.6 34.3*   LDLCALC 110*  --    LDLF  --  117   VLDL 17 15   TRIG 83 76   , Iron Panel + Serum Ferritin Trend: No results for input(s): \"IRON\", \"UIBC\", \"TIBC\", \"IRONSAT\", \"FERRITIN\" in the last 06557 hours., Vitamin B12: No results found for: \"NLCFTQJI37\" , Folate: No results found for: \"FOLATE\" , and Vitamin D: No results found for: \"VITD25\"     Medications:  Current Outpatient Medications   Medication Instructions    Ajovy Autoinjector 225 mg, subcutaneous, Every 28 days    cyclobenzaprine (FLEXERIL) 10 mg, oral, Nightly PRN    levonorgestreL (Kyleena) 17.5 mcg/24 hrs (5 yrs) 19.5 mg intrauterine device Once    methylphenidate ER 36 mg, Daily before breakfast    methylPREDNISolone (Medrol Dospak) 4 mg tablets Follow schedule on package instructions    Zavzpret 10 mg, nasal, Daily PRN      Nutrition Diagnosis   Malnutrition Diagnosis  Patient has Malnutrition Diagnosis: No    Nutrition Diagnosis  Patient has Nutrition Diagnosis: Yes  Diagnosis Status (1): New  Nutrition Diagnosis 1: Unbalanced diet pattern  Related to (1): appetite signals disrupted by medication, she hasn't established a comfortable pattern of " selecting meals/prepping them and eating at regular intervals  As Evidenced by (1): diet recall reveals she has added a smoothie/shake in the morning to help balance her diet pattern, but she describes that typically breakfast is small, lunch is small/moderate, and at dinner she can experience binge eating.       Nutrition Interventions/Recommendations   Nutrition Prescription: Oral nutrition Regular diet    Nutrition Interventions:   Food and Nutrient Delivery: Meals & Snacks: Modification of schedule of oral intake, Modify composition of oral intake  Goals: 1. She will eat at regular intervals such as smoothie/shake for breakfast, leftovers for lunch, snack after work, and dinner 2. She will use the Plate Method to guide the composition of meals, which will promote a balance of amount/type of foods/nutrients on the plate.     Coordination of Care: Collaboration and referral of nutrition care:  (N/A)     Nutrition Education:   Nutrition Education Content: Content related nutrition education    Educational Handouts Provided: 33% Plate Method, High Protein Snack Ideas, and High Protein Meal Ideas    Nutrition Counseling:   Nutrition Counseling Strategies : Nutrition counseling based on goal setting strategy, Nutrition counseling based on motivational interviewing strategy    Readiness to Change : Good  Level of Understanding : Good  Anticipated Compliant : Good         Nutrition Monitoring and Evaluation   Food and Nutrient Intake  Monitoring and Evaluation Plan: Meal/snack pattern, Amount of food  Amount of Food: Estimated amout of food, Types of food  Criteria: Plate Method to guide meals  Meal/Snack Pattern: Estimated meal and snack pattern  Criteria: At least 3 times per day, + 1-2 snacks                             Follow Up: 4/3 at 3:30    Time Spent  Prep time on day of patient encounter: 5 minutes  Time spent directly with patient, family or caregiver: 60 minutes  Additional Time Spent on Patient Care  Activities: 0 minutes  Documentation Time: 10 minutes  Other Time Spent: 0 minutes  Total: 75 minutes

## 2025-02-14 VITALS — HEIGHT: 62 IN | WEIGHT: 185.4 LBS | BODY MASS INDEX: 34.12 KG/M2

## 2025-02-14 DIAGNOSIS — G93.2 IIH (IDIOPATHIC INTRACRANIAL HYPERTENSION): Primary | ICD-10-CM

## 2025-02-19 DIAGNOSIS — G93.2 IIH (IDIOPATHIC INTRACRANIAL HYPERTENSION): ICD-10-CM

## 2025-02-19 DIAGNOSIS — G43.719 INTRACTABLE CHRONIC MIGRAINE WITHOUT AURA AND WITHOUT STATUS MIGRAINOSUS: Primary | ICD-10-CM

## 2025-02-19 DIAGNOSIS — H93.A9 PULSATILE TINNITUS: ICD-10-CM

## 2025-02-19 NOTE — PROGRESS NOTES
Patient has persistent headaches, not responding to migraine treatment. Along with tinnitus   Concern for IIH  Referral placed to neuro-ophthalmology, MRV and IR guided LP to check opening pressure

## 2025-02-24 ENCOUNTER — APPOINTMENT (OUTPATIENT)
Dept: RADIOLOGY | Facility: CLINIC | Age: 25
End: 2025-02-24
Payer: COMMERCIAL

## 2025-02-24 DIAGNOSIS — G93.2 IIH (IDIOPATHIC INTRACRANIAL HYPERTENSION): ICD-10-CM

## 2025-02-24 DIAGNOSIS — G43.719 INTRACTABLE CHRONIC MIGRAINE WITHOUT AURA AND WITHOUT STATUS MIGRAINOSUS: Primary | ICD-10-CM

## 2025-02-28 PROCEDURE — RXMED WILLOW AMBULATORY MEDICATION CHARGE

## 2025-03-03 ENCOUNTER — TELEPHONE (OUTPATIENT)
Dept: NEUROLOGY | Facility: CLINIC | Age: 25
End: 2025-03-03
Payer: COMMERCIAL

## 2025-03-03 DIAGNOSIS — G43.719 INTRACTABLE CHRONIC MIGRAINE WITHOUT AURA AND WITHOUT STATUS MIGRAINOSUS: Primary | ICD-10-CM

## 2025-03-03 PROCEDURE — RXMED WILLOW AMBULATORY MEDICATION CHARGE

## 2025-03-04 ENCOUNTER — SPECIALTY PHARMACY (OUTPATIENT)
Dept: PHARMACY | Facility: CLINIC | Age: 25
End: 2025-03-04

## 2025-03-04 ENCOUNTER — PHARMACY VISIT (OUTPATIENT)
Dept: PHARMACY | Facility: CLINIC | Age: 25
End: 2025-03-04
Payer: COMMERCIAL

## 2025-03-05 ENCOUNTER — APPOINTMENT (OUTPATIENT)
Dept: DERMATOLOGY | Facility: CLINIC | Age: 25
End: 2025-03-05
Payer: COMMERCIAL

## 2025-03-07 ENCOUNTER — HOSPITAL ENCOUNTER (OUTPATIENT)
Dept: RADIOLOGY | Facility: HOSPITAL | Age: 25
Discharge: HOME | End: 2025-03-07
Payer: COMMERCIAL

## 2025-03-07 DIAGNOSIS — G93.2 IIH (IDIOPATHIC INTRACRANIAL HYPERTENSION): ICD-10-CM

## 2025-03-07 PROCEDURE — 70544 MR ANGIOGRAPHY HEAD W/O DYE: CPT

## 2025-03-11 ENCOUNTER — PHARMACY VISIT (OUTPATIENT)
Dept: PHARMACY | Facility: CLINIC | Age: 25
End: 2025-03-11
Payer: COMMERCIAL

## 2025-03-14 LAB
ERYTHROCYTE [DISTWIDTH] IN BLOOD BY AUTOMATED COUNT: 12.7 % (ref 11–15)
HCT VFR BLD AUTO: 43.2 % (ref 35–45)
HGB BLD-MCNC: 14.5 G/DL (ref 11.7–15.5)
INR PPP: 1
MCH RBC QN AUTO: 28.8 PG (ref 27–33)
MCHC RBC AUTO-ENTMCNC: 33.6 G/DL (ref 32–36)
MCV RBC AUTO: 85.7 FL (ref 80–100)
PLATELET # BLD AUTO: 242 THOUSAND/UL (ref 140–400)
PMV BLD REES-ECKER: 11.2 FL (ref 7.5–12.5)
PROTHROMBIN TIME: 10.4 SEC (ref 9–11.5)
RBC # BLD AUTO: 5.04 MILLION/UL (ref 3.8–5.1)
WBC # BLD AUTO: 9 THOUSAND/UL (ref 3.8–10.8)

## 2025-03-19 ENCOUNTER — HOSPITAL ENCOUNTER (OUTPATIENT)
Dept: RADIOLOGY | Facility: HOSPITAL | Age: 25
Discharge: HOME | End: 2025-03-19
Payer: COMMERCIAL

## 2025-03-19 VITALS
DIASTOLIC BLOOD PRESSURE: 85 MMHG | WEIGHT: 184 LBS | HEIGHT: 62 IN | SYSTOLIC BLOOD PRESSURE: 127 MMHG | HEART RATE: 94 BPM | BODY MASS INDEX: 33.86 KG/M2 | OXYGEN SATURATION: 98 % | TEMPERATURE: 96.8 F | RESPIRATION RATE: 14 BRPM

## 2025-03-19 DIAGNOSIS — G93.2 IIH (IDIOPATHIC INTRACRANIAL HYPERTENSION): ICD-10-CM

## 2025-03-19 LAB
APPEARANCE CSF: CLEAR
BASOPHILS NFR CSF MANUAL: 0 %
BLASTS CSF MANUAL: 0 %
COLOR CSF: COLORLESS
COLOR SPUN CSF: COLORLESS
EOSINOPHIL NFR CSF MANUAL: 0 %
GLUCOSE CSF-MCNC: 59 MG/DL (ref 40–70)
HCG UR QL IA.RAPID: NEGATIVE
IMM GRANULOCYTES NFR CSF: 0 %
LYMPHOCYTES NFR CSF MANUAL: 65 % (ref 28–96)
MONOS+MACROS NFR CSF MANUAL: 35 % (ref 16–56)
NEUTS SEG NFR CSF MANUAL: 0 % (ref 0–5)
OTHER CELLS NFR CSF MANUAL: 0 %
PLASMA CELLS NFR CSF MICRO: 0 %
PROT CSF-MCNC: 41 MG/DL (ref 15–45)
RBC # CSF AUTO: 0 /UL (ref 0–5)
TOTAL CELLS COUNTED CSF: 23
TUBE # CSF: ABNORMAL
WBC # CSF AUTO: <1 /UL (ref 1–5)

## 2025-03-19 PROCEDURE — 89051 BODY FLUID CELL COUNT: CPT | Performed by: STUDENT IN AN ORGANIZED HEALTH CARE EDUCATION/TRAINING PROGRAM

## 2025-03-19 PROCEDURE — 2500000001 HC RX 250 WO HCPCS SELF ADMINISTERED DRUGS (ALT 637 FOR MEDICARE OP): Performed by: RADIOLOGY

## 2025-03-19 PROCEDURE — 7100000009 HC PHASE TWO TIME - INITIAL BASE CHARGE

## 2025-03-19 PROCEDURE — 84157 ASSAY OF PROTEIN OTHER: CPT | Performed by: STUDENT IN AN ORGANIZED HEALTH CARE EDUCATION/TRAINING PROGRAM

## 2025-03-19 PROCEDURE — 62328 DX LMBR SPI PNXR W/FLUOR/CT: CPT

## 2025-03-19 PROCEDURE — 82945 GLUCOSE OTHER FLUID: CPT | Performed by: STUDENT IN AN ORGANIZED HEALTH CARE EDUCATION/TRAINING PROGRAM

## 2025-03-19 PROCEDURE — 62328 DX LMBR SPI PNXR W/FLUOR/CT: CPT | Performed by: RADIOLOGY

## 2025-03-19 PROCEDURE — 7100000010 HC PHASE TWO TIME - EACH INCREMENTAL 1 MINUTE

## 2025-03-19 PROCEDURE — 81025 URINE PREGNANCY TEST: CPT | Performed by: RADIOLOGY

## 2025-03-19 RX ADMIN — ACETAMINOPHEN, CAFFEINE 2 TABLET: 500; 65 TABLET, FILM COATED ORAL at 16:40

## 2025-03-19 ASSESSMENT — PAIN - FUNCTIONAL ASSESSMENT: PAIN_FUNCTIONAL_ASSESSMENT: 0-10

## 2025-03-19 ASSESSMENT — PAIN SCALES - GENERAL
PAINLEVEL_OUTOF10: 5 - MODERATE PAIN
PAINLEVEL_OUTOF10: 7
PAINLEVEL_OUTOF10: 0 - NO PAIN
PAINLEVEL_OUTOF10: 0 - NO PAIN

## 2025-03-19 ASSESSMENT — PAIN DESCRIPTION - DESCRIPTORS
DESCRIPTORS: ACHING
DESCRIPTORS: ACHING

## 2025-03-24 ENCOUNTER — APPOINTMENT (OUTPATIENT)
Dept: NEUROLOGY | Facility: CLINIC | Age: 25
End: 2025-03-24
Payer: COMMERCIAL

## 2025-03-24 DIAGNOSIS — G93.2 IIH (IDIOPATHIC INTRACRANIAL HYPERTENSION): ICD-10-CM

## 2025-03-24 DIAGNOSIS — G43.011 INTRACTABLE MIGRAINE WITHOUT AURA AND WITH STATUS MIGRAINOSUS: ICD-10-CM

## 2025-03-24 DIAGNOSIS — G43.719 INTRACTABLE CHRONIC MIGRAINE WITHOUT AURA AND WITHOUT STATUS MIGRAINOSUS: Primary | ICD-10-CM

## 2025-03-24 PROCEDURE — 64615 CHEMODENERV MUSC MIGRAINE: CPT | Performed by: STUDENT IN AN ORGANIZED HEALTH CARE EDUCATION/TRAINING PROGRAM

## 2025-03-24 RX ORDER — METOCLOPRAMIDE 10 MG/1
10 TABLET ORAL ONCE AS NEEDED
Qty: 14 TABLET | Refills: 0 | Status: SHIPPED | OUTPATIENT
Start: 2025-03-24 | End: 2025-04-03

## 2025-03-24 RX ORDER — ONDANSETRON 4 MG/1
4 TABLET, ORALLY DISINTEGRATING ORAL EVERY 8 HOURS PRN
Qty: 20 TABLET | Refills: 0 | Status: SHIPPED | OUTPATIENT
Start: 2025-03-24 | End: 2025-03-31

## 2025-03-24 RX ORDER — ACETAZOLAMIDE 125 MG/1
250 TABLET ORAL 2 TIMES DAILY
Qty: 120 TABLET | Refills: 11 | Status: SHIPPED | OUTPATIENT
Start: 2025-03-24 | End: 2026-03-24

## 2025-03-24 ASSESSMENT — LIFESTYLE VARIABLES
SKIP TO QUESTIONS 9-10: 1
HOW MANY STANDARD DRINKS CONTAINING ALCOHOL DO YOU HAVE ON A TYPICAL DAY: 1 OR 2
HOW OFTEN DO YOU HAVE SIX OR MORE DRINKS ON ONE OCCASION: NEVER
HOW OFTEN DO YOU HAVE A DRINK CONTAINING ALCOHOL: MONTHLY OR LESS
AUDIT-C TOTAL SCORE: 1

## 2025-03-24 NOTE — PROGRESS NOTES
Neurology Botulinum Injection    Subjective   Nathalia Monzon is an 25 y.o. female here for medical botulinum injection for Intractable chronic migraine without aura and without status migrainosus [G43.719].     MRV showed no CVST. Prominent flow enhancement at the junction of transverse and sigmoid sinus which may be seen in IIH  LP had opening pressure of [id number]  No significant improvement since LP but had post LP headache. Now having daily headache, worse when she sits up and better when she lies down. She tried caffeine, excedrin and increased salt intake     Botox continues to work well for the migraines. She continues to have tinnitus, pulsating noise in her ears and frequent headaches.     Objective   Neurological Exam  Mental Status  Awake, alert and oriented to person, place and time. Speech is normal.    Cranial Nerves  CN III, IV, VI: Extraocular movements intact bilaterally.  CN VII: Full and symmetric facial movement.  CN VIII: Hearing is normal.    Motor   Strength is 5/5 throughout all four extremities.    Gait  Casual gait is normal including stance, stride, and arm swing.    Physical Exam  Eyes:      Extraocular Movements: Extraocular movements intact.   Neurological:      Motor: Motor strength is normal.  Psychiatric:         Speech: Speech normal.       Head/Face/Jaw Botulinum Injection    Date/Time: 3/24/2025 1:32 PM    Performed by: Jacinda Chandler MD  Authorized by: Jacinda Chandler MD      Consent:      Consent obtained:  Written     Consent given by:  Patient     Risks discussed:  Dysphagia, weakness, poor cosmetic result, infection and bleeding     Alternatives discussed:  No treatment    Universal protocol:      Relevant documents present and verified:  Yes       Site/side verified:  Yes       Immediately prior to procedure a time out was called:  Yes       Patient identity confirmed:  Verbally with patient    Procedure details:      EMG used?  No     Electrical stimulation used?  No      Diluted by:  Preservative free saline     Total units available:  200       Right frontalis:  10 units divided amongst site(s)     Left frontalis:  10 units divided amongst site(s)     Right :  5 units divided amongst site(s)     Left :  5 units divided amongst site(s)     Procerus (midline):  5 units divided amongst site(s)     Right occipitalis:  15 units divided amongst site(s)     Left occipitalis:  15 units divided amongst site(s)     Right cervical paraspinal:  10 units divided amongst site(s)     Left cervical paraspinal:  10 units divided amongst site(s)     Right trapezius:  15 units divided amongst site(s)     Left trapezius:  15 units divided amongst site(s)     Right temporalis:  20 units divided amongst site(s)     Left temporalis:  20 units divided amongst site(s)         Total units injected:  155     Total units wasted:  45    Post-procedure details:      Patient tolerance of procedure:  Tolerated well, no immediate complications    Assessment/Plan     Diagnoses and all orders for this visit:  Intractable chronic migraine without aura and without status migrainosus  -     onabotulinumtoxinA (Botox) injection 155 Units  Possible IIH. Will start acetazolamide 250mg BID. Repeat BMP in 1 month. Has appointment with neuroophthalmology in June  Post LP headache.     F/up in 90 days for botox

## 2025-04-03 ENCOUNTER — APPOINTMENT (OUTPATIENT)
Dept: PRIMARY CARE | Facility: CLINIC | Age: 25
End: 2025-04-03
Payer: COMMERCIAL

## 2025-05-15 ENCOUNTER — OFFICE VISIT (OUTPATIENT)
Dept: URGENT CARE | Age: 25
End: 2025-05-15
Payer: COMMERCIAL

## 2025-05-15 VITALS
SYSTOLIC BLOOD PRESSURE: 151 MMHG | DIASTOLIC BLOOD PRESSURE: 98 MMHG | HEART RATE: 90 BPM | RESPIRATION RATE: 16 BRPM | OXYGEN SATURATION: 99 % | TEMPERATURE: 98.3 F

## 2025-05-15 DIAGNOSIS — R39.89 SUSPECTED UTI: Primary | ICD-10-CM

## 2025-05-15 DIAGNOSIS — R39.198 DIFFICULTY URINATING: ICD-10-CM

## 2025-05-15 LAB
POC BILIRUBIN, URINE: NEGATIVE
POC BLOOD, URINE: NEGATIVE
POC GLUCOSE, URINE: NEGATIVE MG/DL
POC KETONES, URINE: NEGATIVE MG/DL
POC LEUKOCYTES, URINE: ABNORMAL
POC NITRITE,URINE: POSITIVE
POC PH, URINE: 6 PH
POC PROTEIN, URINE: NEGATIVE MG/DL
POC SPECIFIC GRAVITY, URINE: 1.01
POC UROBILINOGEN, URINE: 1 EU/DL
PREGNANCY TEST URINE, POC: NEGATIVE

## 2025-05-15 PROCEDURE — 81003 URINALYSIS AUTO W/O SCOPE: CPT

## 2025-05-15 PROCEDURE — 81025 URINE PREGNANCY TEST: CPT

## 2025-05-15 PROCEDURE — 99203 OFFICE O/P NEW LOW 30 MIN: CPT

## 2025-05-15 RX ORDER — SULFAMETHOXAZOLE AND TRIMETHOPRIM 800; 160 MG/1; MG/1
1 TABLET ORAL EVERY 12 HOURS
Qty: 14 TABLET | Refills: 0 | Status: SHIPPED | OUTPATIENT
Start: 2025-05-15 | End: 2025-05-22

## 2025-05-15 NOTE — PROGRESS NOTES
Subjective   Patient ID: Nathalia Monzon is a 25 y.o. female. They present today with a chief complaint of Difficulty Urinating (X 2 days).    HISTORY OF PRESENT ILLNESS:    Presents to the clinic for suprapubic pain/pressure occasionally radiating to the L hip/lower back, dysuria, and increased urinary urgency. States symptoms have worsened over the last 2 days. Reports history of both bladder and kidney infections. No fevers, nausea, or vomiting. Reports occasional vaginal spotting at baseline due to having IUD.     Past Medical History  Allergies as of 05/15/2025    (No Known Allergies)       Current Outpatient Medications   Medication Instructions    acetaZOLAMIDE (DIAMOX) 250 mg, oral, 2 times daily    Ajovy Autoinjector 225 mg, subcutaneous, Every 28 days    cyclobenzaprine (FLEXERIL) 10 mg, oral, Nightly PRN    levonorgestreL (Kyleena) 17.5 mcg/24 hrs (5 yrs) 19.5 mg intrauterine device Once    methylphenidate ER 36 mg, Daily before breakfast    methylPREDNISolone (Medrol Dospak) 4 mg tablets Follow schedule on package instructions    metoclopramide (REGLAN) 10 mg, oral, Once as needed    onabotulinumtoxinA (Botox) 200 unit injection Provider to inject 155 Units into the muscle every 3 months.    sulfamethoxazole-trimethoprim (Bactrim DS) 800-160 mg tablet 1 tablet, oral, Every 12 hours, Take with full glass of water.    Zavzpret 10 mg, nasal, Daily PRN         Medical History[1]    Surgical History[2]     reports that she has never smoked. She has never used smokeless tobacco. She reports that she does not currently use alcohol. She reports that she does not use drugs.    Review of Systems    Review of Systems   Constitutional:  Negative for fever.   Gastrointestinal:  Negative for vomiting.   Genitourinary:  Positive for dysuria, pelvic pain and urgency. Negative for hematuria and vaginal discharge.   Skin:  Negative for rash.                                 Objective    Vitals:    05/15/25 1914   BP: (!)  151/98   Pulse: 90   Resp: 16   Temp: 36.8 °C (98.3 °F)   SpO2: 99%     No LMP recorded. (Menstrual status: IUD).  PHYSICAL EXAMINATION:    Physical Exam  Vitals and nursing note reviewed.   Constitutional:       General: She is not in acute distress.     Appearance: Normal appearance. She is not ill-appearing.   HENT:      Head: Normocephalic and atraumatic.      Nose: Nose normal.   Eyes:      General:         Right eye: No discharge.         Left eye: No discharge.      Extraocular Movements: Extraocular movements intact.      Conjunctiva/sclera: Conjunctivae normal.   Cardiovascular:      Rate and Rhythm: Normal rate.      Comments: + Hypertensive  Pulmonary:      Effort: Pulmonary effort is normal. No respiratory distress.   Abdominal:      General: There is no distension.      Tenderness: There is no right CVA tenderness, left CVA tenderness or guarding.   Musculoskeletal:      Cervical back: Normal range of motion and neck supple.   Skin:     General: Skin is warm and dry.   Neurological:      General: No focal deficit present.      Mental Status: She is alert and oriented to person, place, and time.   Psychiatric:         Mood and Affect: Mood normal.         Behavior: Behavior normal.        Procedures    Results for orders placed or performed in visit on 05/15/25   POCT UA Automated manually resulted   Result Value Ref Range    POC Glucose, Urine NEGATIVE NEGATIVE mg/dl    POC Bilirubin, Urine NEGATIVE NEGATIVE    POC Ketones, Urine NEGATIVE NEGATIVE mg/dl    POC Specific Gravity, Urine 1.010 1.005 - 1.035    POC Blood, Urine NEGATIVE NEGATIVE    POC PH, Urine 6.0 No Reference Range Established PH    POC Protein, Urine NEGATIVE NEGATIVE mg/dl    POC Urobilinogen, Urine 1.0 0.2, 1.0 EU/DL    Poc Nitrite, Urine POSITIVE (A) NEGATIVE    POC Leukocytes, Urine TRACE (A) NEGATIVE   POCT pregnancy, urine manually resulted   Result Value Ref Range    Preg Test, Ur Negative Negative       Diagnostic study results  (if any) were reviewed by Bailey Morales PA-C.    Assessment/Plan   Allergies, medications, history, and pertinent labs/EKGs/Imaging reviewed by Bailey Morales PA-C.     Orders and Diagnoses  Diagnoses and all orders for this visit:  Suspected UTI  -     Urine Culture  -     sulfamethoxazole-trimethoprim (Bactrim DS) 800-160 mg tablet; Take 1 tablet by mouth every 12 hours for 7 days. Take with full glass of water.  Difficulty urinating  -     POCT UA Automated manually resulted  -     POCT pregnancy, urine manually resulted  -     Urine Culture      Medical Admin Record    Given overall well appearance, vital signs, history, and exam as above, there is no indication for further emergent testing/intervention at this time.      I discussed with the patient my clinical thoughts at this time given the above and we had a shared decision-making conversation in a patient-centered decision-making model on how to proceed forward. Pt was instructed on the importance of close follow-up. They were told that an urgent care diagnosis is often a preliminary impression and that definitive care is often not able to be given in the urgent care setting. Pt was educated that close follow-up is essential for good health and good outcomes. Patient was provided with the following instructions:         Await urine cx. Begin abx as directed.       May continue to take AZO for symptomatic relief as needed.     Plenty of fluids and rest.      Follow up with PCP in the next 7 days if sx fail to show adequate improvement.        Clinical impression as well as limitations of available testing/examination, all discussed with patient. Pt is well at this time in the urgent care. They are comfortable with the present assessment and plan to be discharged home. Discussed with them close outpatient follow up, reassessment, and possible further testing/treatment via their PCP/specialist if symptoms fail to improve; they agree, understand, and are  comfortable with this plan. Pt given the opportunity to ask questions prior to discharge and all questions were answered at this time. Via our discussion, the patient was advised of warning signs and instructions were reviewed. Strict ED precautions were given, acknowledged, and understood. Discussed with the patient/family that it is okay to return to the urgent care at any time for anything. Advised to present to the ED if present condition changes/worsens or if they develop new symptoms at any time after discharge. Also, advised to go to the ED if they cannot establish outpatient follow-up in time frame specified above. Pt verbalized understanding and agreement with plan and instructions. Discussed the need for close follow up with their primary care provider and/or specialist for further testing/treatment/care/consultation in the short time frame as noted above, if needed - they understand these instructions and agree to close follow up for these reasons. Patient discharged home in stable condition.      Follow Up Instructions  No follow-ups on file.    Patient disposition: Home    Electronically signed by Bailey Morales PA-C  7:56 AM         [1]   Past Medical History:  Diagnosis Date    Eczema Dry skin: 10+years    Migraines     Mollusca contagiosa Suspected/unconfirmed in [id number]    Personal history of urinary (tract) infections 07/15/2021    History of urinary tract infection    Varicella In childhood 2011 or 2012   [2]   Past Surgical History:  Procedure Laterality Date    OTHER SURGICAL HISTORY  08/19/2021    No history of surgery

## 2025-05-16 ASSESSMENT — ENCOUNTER SYMPTOMS
VOMITING: 0
FEVER: 0
DYSURIA: 1
HEMATURIA: 0

## 2025-05-17 LAB — BACTERIA UR CULT: NORMAL

## 2025-05-24 ENCOUNTER — SPECIALTY PHARMACY (OUTPATIENT)
Dept: PHARMACY | Facility: CLINIC | Age: 25
End: 2025-05-24

## 2025-06-02 ENCOUNTER — APPOINTMENT (OUTPATIENT)
Dept: OPHTHALMOLOGY | Facility: CLINIC | Age: 25
End: 2025-06-02
Payer: COMMERCIAL

## 2025-06-02 DIAGNOSIS — G93.2 IIH (IDIOPATHIC INTRACRANIAL HYPERTENSION): ICD-10-CM

## 2025-06-02 DIAGNOSIS — R93.0 ABNORMAL MRI OF THE HEAD: Primary | ICD-10-CM

## 2025-06-02 PROCEDURE — 92133 CPTRZD OPH DX IMG PST SGM ON: CPT | Performed by: PSYCHIATRY & NEUROLOGY

## 2025-06-02 PROCEDURE — 99204 OFFICE O/P NEW MOD 45 MIN: CPT | Performed by: PSYCHIATRY & NEUROLOGY

## 2025-06-02 ASSESSMENT — CUP TO DISC RATIO
OD_RATIO: 0.4
OS_RATIO: 0.4

## 2025-06-02 ASSESSMENT — VISUAL ACUITY
METHOD: SNELLEN - LINEAR
OD_CC: 20/20
OS_CC: 20/20
CORRECTION_TYPE: GLASSES

## 2025-06-02 ASSESSMENT — TONOMETRY
OD_IOP_MMHG: 24
IOP_METHOD: TONOPEN
OS_IOP_MMHG: 23
OS_IOP_MMHG: 16
IOP_METHOD: APPLANATION
OD_IOP_MMHG: 17

## 2025-06-02 ASSESSMENT — ENCOUNTER SYMPTOMS
GASTROINTESTINAL NEGATIVE: 0
ALLERGIC/IMMUNOLOGIC NEGATIVE: 0
NEUROLOGICAL NEGATIVE: 0
HEMATOLOGIC/LYMPHATIC NEGATIVE: 0
PSYCHIATRIC NEGATIVE: 0
EYES NEGATIVE: 1
CARDIOVASCULAR NEGATIVE: 0
ENDOCRINE NEGATIVE: 0
CONSTITUTIONAL NEGATIVE: 0
MUSCULOSKELETAL NEGATIVE: 0
RESPIRATORY NEGATIVE: 0

## 2025-06-02 ASSESSMENT — SLIT LAMP EXAM - LIDS
COMMENTS: NORMAL
COMMENTS: NORMAL

## 2025-06-02 ASSESSMENT — CONF VISUAL FIELD
OD_INFERIOR_TEMPORAL_RESTRICTION: 0
OS_INFERIOR_TEMPORAL_RESTRICTION: 0
OD_SUPERIOR_TEMPORAL_RESTRICTION: 0
OS_INFERIOR_NASAL_RESTRICTION: 0
OS_SUPERIOR_TEMPORAL_RESTRICTION: 0
OS_NORMAL: 1
OS_SUPERIOR_NASAL_RESTRICTION: 0
OD_SUPERIOR_NASAL_RESTRICTION: 0
OD_INFERIOR_NASAL_RESTRICTION: 0
OD_NORMAL: 1

## 2025-06-02 ASSESSMENT — EXTERNAL EXAM - LEFT EYE: OS_EXAM: NORMAL

## 2025-06-02 ASSESSMENT — EXTERNAL EXAM - RIGHT EYE: OD_EXAM: NORMAL

## 2025-06-02 NOTE — LETTER
June 2, 2025     Jacinda Chandler MD  [address], [address]  [facility] [address]    Patient: Nathalia Monzon   YOB: 2000   Date of Visit: 6/2/2025     Dear Dr. Jacinda Chandler MD:    I am writing to share my findings regarding our shared patient Nathalia Monzon from her visit with me on 6/2/2025.    HPI    This 25 year-old woman with a history of migraines for which she receives botox, obesity, eczema  presents for evaluation of possible idiopathic intracranial hypertension.     The patient reports the following regarding associated symptoms: headaches: yes, pulsatile tinnitus: yes, almost daily more when lying down, but also sitting up, especially with migraine, as well as other tinnitus, transient visual obscurations: no, & diplopia: yes. She describes years of diplopia happening infrequently, worse when tired. She has not checked whether monocular or binocular. Vision feels less sharp than it should be. Headaches have been less frequent recently. She feels that acetazolamide has been helpful for lower more constant headaches. Migraine had been about 15 days a month, but now are about 1-2 days per month.    She is referred by neurologist Dr. Jacinda Chandler who last saw her on 3/24/2025, and treated her with botulinum toxin injection for headache and started acetazolamide 250mg BID for possible idiopathic intracranial hypertension.     She rarely has dark spots in her vision, less than weekly.  Last edited by Bernard Dewitt MD PhD on 6/2/2025  8:41 AM.        Diagnoses    Diagnoses and all orders for this visit:  Abnormal MRI of the head (Primary)  -     OCT, Optic Nerve - OU - Both Eyes  IIH (idiopathic intracranial hypertension)  -     Referral to Ophthalmology  -     Referral to Ophthalmology  -     OCT, Optic Nerve - OU - Both Eyes    Assessment and Plan    02/19/2025 MRV without IV contrast, which I personally reviewed, shows distal transverse sinus narrowing and by radiology  "report, \"prominent attenuation of flow related enhancement at the junction of the sigmoid and transverse sinus bilaterally which may be seen in idiopathic intracranial hypertension\".     03/19/2025 lumbar puncture opening pressure 24 cm of water, RBC 0, WBC <1, protein 41 & glucose 59.    Lab Results   Component Value Date/Time    SEDRATE 18 06/15/2023 1251    CRP 2.29 (A) 06/15/2023 1251    HSCRP 17.7 (H) 06/04/2024 1017    HSCRP 19.1 (A) 06/03/2023 1039     06/15/2023 Anti-DNA Ab negative, ZOEY negative, Citrulline Ab negative, C3 wnl, C4 wnl, RF wnl   06/03/2023 HIV Ab nonreactive    06/02/2025 OCT RNFL OD 93 & OS 93.    This 25 year-old woman with a history of migraines for which she receives botulinum toxin, obesity, eczema  presents for evaluation of possible idiopathic intracranial hypertension.     She has good vision with normal optic nerves. While idiopathic intracranial hypertension without papilledema has been described, I recommend treating as headaches with migraine as an already existing diagnosis. The dose of acetazolamide is very low, so I do not think that it has masked prior optic disc edema.    I will defer to Dr. Chandler on whether to pursue MRI brain with contrast, which typically is a part of idiopathic intracranial hypertension evaluation although I see no papilledema to prompt this imaging from the neuro-ophthalmology perspective. She can follow up with neuro-ophthalmology for monitoring of vision and optic nerves if desired.    Plan    Treatment with Dr. Chandler for headache with consideration of further imaging.    Follow up with neuro-ophthalmology as needed. (dilated 6/2/2025)      Below you will find my full examination. I appreciate the opportunity to see Nathalia Monzon today and to share in her care with you. Please contact me if you have questions for me regarding this visit or if I can be of assistance to another of your patients with neuro-ophthalmological " problems.    Sincerely,    Bernard Dewitt MD PhD    CC:   No Recipients      Base Eye Exam       Visual Acuity (Snellen - Linear)         Right Left    Dist cc 20/20 20/20 .-1      Correction: Glasses              Tonometry (Tonopen, 8:23 AM)         Right Left    Pressure 24 23              Tonometry #2 (Applanation, 8:44 AM)         Right Left    Pressure 17 16              Pupils         Pupils Dark Light Shape React APD    Right PERRL, No APD 6 3 Round Brisk None    Left PERRL, No APD 6 3 Round Brisk None              Visual Fields         Left Right     Full Full              Extraocular Movement         Right Left     Full Full              Neuro/Psych       Oriented x3: Yes              Dilation       Both eyes: 1% Mydriacyl & 2.5% Sebastian  @ 8:37 AM                  Additional Tests       Color         Right Left    Ishihara 12/12 12/12                  Slit Lamp and Fundus Exam       External Exam         Right Left    External Normal Normal              Slit Lamp Exam         Right Left    Lids/Lashes Normal Normal    Conjunctiva/Sclera White and quiet White and quiet    Cornea Clear Clear    Anterior Chamber Deep and quiet Deep and quiet    Iris Round and reactive Round and reactive    Lens Clear Clear    Anterior Vitreous Normal Normal              Fundus Exam         Right Left    Disc Normal Normal    C/D Ratio 0.4 0.4    Macula Normal Normal    Vessels Normal Normal    Periphery Normal Normal

## 2025-06-02 NOTE — PROGRESS NOTES
"Assessment and Plan    02/19/2025 MRV without IV contrast, which I personally reviewed, shows distal transverse sinus narrowing and by radiology report, \"prominent attenuation of flow related enhancement at the junction of the sigmoid and transverse sinus bilaterally which may be seen in idiopathic intracranial hypertension\".     03/19/2025 lumbar puncture opening pressure 24 cm of water, RBC 0, WBC <1, protein 41 & glucose 59.    Lab Results   Component Value Date/Time    SEDRATE 18 06/15/2023 1251    CRP 2.29 (A) 06/15/2023 1251    HSCRP 17.7 (H) 06/04/2024 1017    HSCRP 19.1 (A) 06/03/2023 1039     06/15/2023 Anti-DNA Ab negative, ZOEY negative, Citrulline Ab negative, C3 wnl, C4 wnl, RF wnl   06/03/2023 HIV Ab nonreactive    06/02/2025 OCT RNFL OD 93 & OS 93.    This 25 year-old woman with a history of migraines for which she receives botulinum toxin, obesity, eczema  presents for evaluation of possible idiopathic intracranial hypertension.     She has good vision with normal optic nerves. While idiopathic intracranial hypertension without papilledema has been described, I recommend treating as headaches with migraine as an already existing diagnosis. The dose of acetazolamide is very low, so I do not think that it has masked prior optic disc edema.    I will defer to Dr. Chandler on whether to pursue MRI brain with contrast, which typically is a part of idiopathic intracranial hypertension evaluation although I see no papilledema to prompt this imaging from the neuro-ophthalmology perspective. She can follow up with neuro-ophthalmology for monitoring of vision and optic nerves if desired.    Plan    Treatment with Dr. Chandler for headache with consideration of further imaging.    Follow up with neuro-ophthalmology as needed. (dilated 6/2/2025)  "

## 2025-06-10 PROCEDURE — RXMED WILLOW AMBULATORY MEDICATION CHARGE

## 2025-06-11 ENCOUNTER — PHARMACY VISIT (OUTPATIENT)
Dept: PHARMACY | Facility: CLINIC | Age: 25
End: 2025-06-11
Payer: COMMERCIAL

## 2025-06-23 ENCOUNTER — SPECIALTY PHARMACY (OUTPATIENT)
Dept: PHARMACY | Facility: CLINIC | Age: 25
End: 2025-06-23

## 2025-06-23 DIAGNOSIS — G43.011 INTRACTABLE MIGRAINE WITHOUT AURA AND WITH STATUS MIGRAINOSUS: ICD-10-CM

## 2025-06-23 RX ORDER — FREMANEZUMAB-VFRM 225 MG/1.5ML
225 INJECTION SUBCUTANEOUS
Qty: 4.5 ML | Refills: 3 | Status: SHIPPED | OUTPATIENT
Start: 2025-06-23

## 2025-06-23 NOTE — PROGRESS NOTES
"  [facility] Clinical Note  Patient Reassessment     Introduction  Nathalia Monzon is a 25 y.o. female who is on the specialty pharmacy service for management of: Migraine Core.      [facility] supplied medication: Ajovy 225 mg under the skin every 28 days        Duration of therapy: Maintenance    The most recent encounter visit with the referring prescriber Dr. Chandler on 3/24/25 was reviewed.  Pharmacy will continue to collaborate in the care of this patient with the referring prescriber.    Discussion  Nathalia was contacted on 6/23/2025 at 2:22 PM for a pharmacy visit with encounter number [phone] from:   [facility]  [address] [place] [id number]  Dept: [phone]  Dept Fax: [phone]  Nathalia consented to a/an Telephone visit, which was performed.    Efficacy  Patient has developed new symptoms of condition: No  Patient/caregiver feels medication is affecting the disease state: Yes, migraine frequency is improved. Patient does experience breakthrough migraines during last week of treatment or when Ajovy injection lines up with Botox injection in the same week    Goals  Provided education on goals and possible outcomes of therapy:  Adherence with therapy  Timely completion of appropriate labs  Timely and appropriate follow up with provider  Identify and address medication interactions with presciption medications, OTC medications and supplements  Optimize or maintain quality of life  Neurology- Migraine: 50% reduction in migraine days each month from baseline  Decreased use of \"prn\" agents to treat migraine headaches  Improvement in MIDAS score from baseline  Patient has documented target(s) for goals of therapy: No        Tolerance  Patient has experienced side effects from this medication: No  Changes to current therapy regimen: No    The follow-up timeline was discussed. Every person responds to and reacts to therapy differently. Patient " should be assessed for efficacy and tolerability in approximately: 3 months            Adherence  Patient Information  Informant: Self (Patient)  Demonstrates Understanding of Importance of Adherence: Yes  Does the patient have any barriers to self-administration (including physical and mental?): No  Medication Information  Medication: fremanezumab-vfrm (Ajovy Autoinjector)  Patient Reported Missed Doses in the Last 4 Weeks: 0  Estimated Medication Adherence Level: Good  Barriers to Adherence: No Problems identified   The importance of adherence was discussed and patient/caregiver was advised to take the medication as prescribed by their provider. Encouraged patient/caregiver to call physician's office or specialty pharmacy if they have a question regarding a missed dose.    General Assessment  Changes to home medications, OTCs or supplements: Yes - started acetazolamide. No ddi with Ajovy   Current Medications[1]  Reported new allergies: No  Reported new medical conditions: No  Additional monitoring reviewed: Neurology - Migraine - There are no routine laboratory monitoring parameters for this medication  Is laboratory follow up needed? No    Advised to contact the pharmacy if there are any changes to the patient's medication list, including prescriptions, OTC medications, herbal products, or supplements.    Impression/Plan  This patient has not been identified as high risk due to Lack of high risk qualifiers.  The following action was taken:N/A          QOL/Patient Satisfaction  Rate your quality of life on scale of 1-10: 9  Rate your satisfaction with  Specialty Pharmacy on scale of 1-10: 10 - Completely satisfied    Provided contact information ([phone]) for [facility] and reviewed dispensing process, refill timeline and patient management follow up. Confirmed understanding of education conducted during assessment. All questions and concerns were addressed and patient/caregiver  was encouraged to reach out for additional questions or concerns.    Based on the patient's diagnosis, medication list, progress towards goals, adherence, tolerance, and medication list, medication remains appropriate: Therapy remains appropriate (I attest)    Damaris A Weiland, PharmD       [1]   Current Outpatient Medications   Medication Sig Dispense Refill    acetaZOLAMIDE (Diamox) 125 mg tablet Take 2 tablets (250 mg) by mouth 2 times a day. 120 tablet 11    cyclobenzaprine (Flexeril) 10 mg tablet Take 1 tablet (10 mg) by mouth as needed at bedtime for muscle spasms. 30 tablet 0    fremanezumab (Ajovy Autoinjector) 225 mg/1.5 mL auto-injector Inject 1 Pen (225 mg) under the skin every 28 (twenty-eight) days. 4.5 mL 3    levonorgestreL (Kyleena) 17.5 mcg/24 hrs (5 yrs) 19.5 mg intrauterine device by intrauterine route 1 time.      methylphenidate (Concerta) 36 mg daily tablet Take 1 tablet (36 mg) by mouth once daily in the morning. Take before meals.      methylPREDNISolone (Medrol Dospak) 4 mg tablets Follow schedule on package instructions 21 tablet 0    metoclopramide (Reglan) 10 mg tablet Take 1 tablet (10 mg) by mouth 1 time if needed (nausea, vomiting) for up to 10 days. 14 tablet 0    onabotulinumtoxinA (Botox) 200 unit injection Provider to inject 155 Units into the muscle every 3 months. 2 each 3    zavegepant (Zavzpret) 10 mg/actuation spray,non-aerosol Administer 10 mg into affected nostril(s) once daily as needed (migraine). 6 each 1     No current facility-administered medications for this visit.

## 2025-06-23 NOTE — PROGRESS NOTES
"  [facility] Clinical Note  Patient Reassessment     Introduction  Nathalia Monzon is a 25 y.o. female who is on the specialty pharmacy service for management of: Migraine Core.      [facility] supplied medication: Ajovy 225 mg under the skin every 28 days   {TIP  Please include medication name, dose, route and frequency :47947}     Duration of therapy: Maintenance    The most recent encounter visit with the referring prescriber Dr. Chandler on 3/24/25 was reviewed.  Pharmacy will continue to collaborate in the care of this patient with the referring prescriber.    Discussion  Nathalia was contacted on 6/23/2025 at 2:19 PM for a pharmacy visit with encounter number [phone] from:   [facility]  [address] [place] [id number]  Dept: [phone]  Dept Fax: [phone]  Nathalia consented to a/an Telephone visit, which was performed.    Efficacy  Patient has developed new symptoms of condition: No  Patient/caregiver feels medication is affecting the disease state: Yes, improvement in frequency of migraines overall. Medication wears off during last week or when Ajovy injection week lines up with Botox injections     Goals  Provided education on goals and possible outcomes of therapy:  Adherence with therapy  Timely completion of appropriate labs  Timely and appropriate follow up with provider  Identify and address medication interactions with presciption medications, OTC medications and supplements  Optimize or maintain quality of life  Neurology- Migraine: 50% reduction in migraine days each month from baseline  Decreased use of \"prn\" agents to treat migraine headaches  Improvement in MIDAS score from baseline  Patient has documented target(s) for goals of therapy: No        Tolerance  Patient has experienced side effects from this medication: No  Changes to current therapy regimen: No    The follow-up timeline was discussed. Every person responds to and " reacts to therapy differently. Patient should be assessed for efficacy and tolerability in approximately: 3 months     {TIP  Complete the Medication Adherence Assessment Flowsheet under Clinical Outreach :11059}       Adherence  Patient Information  Informant: Self (Patient)  Demonstrates Understanding of Importance of Adherence: Yes  Does the patient have any barriers to self-administration (including physical and mental?): No  Medication Information  Medication: fremanezumab-vfrm (Ajovy Autoinjector)  Patient Reported Missed Doses in the Last 4 Weeks: 0  Estimated Medication Adherence Level: Good  Barriers to Adherence: No Problems identified   The importance of adherence was discussed and patient/caregiver was advised to take the medication as prescribed by their provider. Encouraged patient/caregiver to call physician's office or specialty pharmacy if they have a question regarding a missed dose.    General Assessment  Changes to home medications, OTCs or supplements: Yes - started taking acetazolamide. No ddi with ajovy  Current Medications[1]  Reported new allergies: No  Reported new medical conditions: No  Additional monitoring reviewed: Neurology - Migraine - There are no routine laboratory monitoring parameters for this medication  Is laboratory follow up needed? No    Advised to contact the pharmacy if there are any changes to the patient's medication list, including prescriptions, OTC medications, herbal products, or supplements.    Impression/Plan  This patient has not been identified as high risk due to Lack of high risk qualifiers.  The following action was taken:N/A    {TIP  Complete the Conclusion Flowsheet including QOL/Patient Satisfaction :39408}           Provided contact information ([phone]) for [facility] and reviewed dispensing process, refill timeline and patient management follow up. Confirmed understanding of education conducted during assessment. All  questions and concerns were addressed and patient/caregiver was encouraged to reach out for additional questions or concerns.    Based on the patient's diagnosis, medication list, progress towards goals, adherence, tolerance, and medication list, medication remains appropriate: Therapy remains appropriate (I attest)    Damaris A Weiland, PharmD         [1]   Current Outpatient Medications   Medication Sig Dispense Refill    acetaZOLAMIDE (Diamox) 125 mg tablet Take 2 tablets (250 mg) by mouth 2 times a day. 120 tablet 11    cyclobenzaprine (Flexeril) 10 mg tablet Take 1 tablet (10 mg) by mouth as needed at bedtime for muscle spasms. 30 tablet 0    fremanezumab (Ajovy Autoinjector) 225 mg/1.5 mL auto-injector Inject 1 Pen (225 mg) under the skin every 28 (twenty-eight) days. 4.5 mL 3    levonorgestreL (Kyleena) 17.5 mcg/24 hrs (5 yrs) 19.5 mg intrauterine device by intrauterine route 1 time.      methylphenidate (Concerta) 36 mg daily tablet Take 1 tablet (36 mg) by mouth once daily in the morning. Take before meals.      methylPREDNISolone (Medrol Dospak) 4 mg tablets Follow schedule on package instructions 21 tablet 0    metoclopramide (Reglan) 10 mg tablet Take 1 tablet (10 mg) by mouth 1 time if needed (nausea, vomiting) for up to 10 days. 14 tablet 0    onabotulinumtoxinA (Botox) 200 unit injection Provider to inject 155 Units into the muscle every 3 months. 2 each 3    zavegepant (Zavzpret) 10 mg/actuation spray,non-aerosol Administer 10 mg into affected nostril(s) once daily as needed (migraine). 6 each 1     No current facility-administered medications for this visit.

## 2025-07-01 ENCOUNTER — APPOINTMENT (OUTPATIENT)
Dept: NEUROLOGY | Facility: CLINIC | Age: 25
End: 2025-07-01
Payer: COMMERCIAL

## 2025-07-03 ENCOUNTER — SPECIALTY PHARMACY (OUTPATIENT)
Dept: PHARMACY | Facility: CLINIC | Age: 25
End: 2025-07-03

## 2025-07-03 PROCEDURE — RXMED WILLOW AMBULATORY MEDICATION CHARGE

## 2025-07-14 ENCOUNTER — APPOINTMENT (OUTPATIENT)
Dept: NEUROLOGY | Facility: CLINIC | Age: 25
End: 2025-07-14
Payer: COMMERCIAL

## 2025-07-15 ENCOUNTER — PHARMACY VISIT (OUTPATIENT)
Dept: PHARMACY | Facility: CLINIC | Age: 25
End: 2025-07-15
Payer: COMMERCIAL

## 2025-07-29 ENCOUNTER — APPOINTMENT (OUTPATIENT)
Dept: NEUROLOGY | Facility: CLINIC | Age: 25
End: 2025-07-29
Payer: COMMERCIAL

## 2025-07-29 DIAGNOSIS — G43.719 INTRACTABLE CHRONIC MIGRAINE WITHOUT AURA AND WITHOUT STATUS MIGRAINOSUS: Primary | ICD-10-CM

## 2025-07-29 RX ORDER — METHYLPHENIDATE HYDROCHLORIDE 5 MG/1
TABLET ORAL
COMMUNITY
Start: 2025-07-21

## 2025-07-29 NOTE — PROGRESS NOTES
Neurology Botulinum Injection    Subjective   Nathalia Monzon is an 25 y.o. female here for medical botulinum injection for Intractable chronic migraine without aura and without status migrainosus [G43.719].     She saw Dr. Dewitt for possible IIH. MRI had possible IIH, LP had OP [id number]. Ophthalmologic exam had normal optic nerves. She was on low dose acetazolamide and there was low suspicion that this masked any papilledema.     Her headaches have been better these past few months. Her daily headaches are lower since starting acetazolamide 250mg BID. The tinnitus is also not as loud.     Botox continues to work very well. Will wear off the last 2 weeks prior to her next series and she will get breakthrough migraines during that time.     She is very thankful for the preventative therapy as she is now able to do social activities such as be a bridesmaid in a wedding.     Objective   Neurological Exam  Mental Status  Awake, alert and oriented to person, place and time. Speech is normal.    Cranial Nerves  CN III, IV, VI: Extraocular movements intact bilaterally.  CN VII: Full and symmetric facial movement.  CN VIII: Hearing is normal.    Motor   Strength is 5/5 throughout all four extremities.    Gait  Casual gait is normal including stance, stride, and arm swing.    Physical Exam    Eyes:      Extraocular Movements: Extraocular movements intact.       Neurological:      Motor: Motor strength is normal.    Psychiatric:         Speech: Speech normal.       Head/Face/Jaw Botulinum Injection    Date/Time: 7/29/2025 2:02 PM    Performed by: Jacinda Chandler MD  Authorized by: Jacinda Chandler MD      Consent:      Consent obtained:  Written     Consent given by:  Patient     Risks discussed:  Dysphagia, weakness, poor cosmetic result, infection and bleeding     Alternatives discussed:  No treatment    Universal protocol:      Relevant documents present and verified:  Yes       Site/side verified:  Yes       Immediately  prior to procedure a time out was called:  Yes       Patient identity confirmed:  Verbally with patient    Procedure details:      EMG used?  No     Electrical stimulation used?  No     Diluted by:  Preservative free saline     Total units available:  200       Right frontalis:  10 units divided amongst site(s)     Left frontalis:  10 units divided amongst site(s)     Right :  5 units divided amongst site(s)     Left :  5 units divided amongst site(s)     Procerus (midline):  5 units divided amongst site(s)     Right occipitalis:  15 units divided amongst site(s)     Left occipitalis:  15 units divided amongst site(s)     Right cervical paraspinal:  10 units divided amongst site(s)     Left cervical paraspinal:  10 units divided amongst site(s)     Right trapezius:  15 units divided amongst site(s)     Left trapezius:  15 units divided amongst site(s)     Right temporalis:  20 units divided amongst site(s)     Left temporalis:  20 units divided amongst site(s)         Total units injected:  155     Total units wasted:  45    Post-procedure details:      Patient tolerance of procedure:  Tolerated well, no immediate complications    Assessment/Plan     Diagnoses and all orders for this visit:  Intractable chronic migraine without aura and without status migrainosus  -     onabotulinumtoxinA (Botox) injection 155 Units    F/up 90 days

## 2025-09-16 ENCOUNTER — APPOINTMENT (OUTPATIENT)
Dept: PRIMARY CARE | Facility: CLINIC | Age: 25
End: 2025-09-16
Payer: COMMERCIAL

## 2025-10-01 ENCOUNTER — APPOINTMENT (OUTPATIENT)
Dept: NEUROLOGY | Facility: CLINIC | Age: 25
End: 2025-10-01
Payer: COMMERCIAL

## 2025-10-24 ENCOUNTER — APPOINTMENT (OUTPATIENT)
Dept: PRIMARY CARE | Facility: CLINIC | Age: 25
End: 2025-10-24
Payer: COMMERCIAL

## 2025-10-30 ENCOUNTER — APPOINTMENT (OUTPATIENT)
Dept: NEUROLOGY | Facility: CLINIC | Age: 25
End: 2025-10-30
Payer: COMMERCIAL